# Patient Record
Sex: FEMALE | Race: WHITE | NOT HISPANIC OR LATINO | Employment: FULL TIME | URBAN - METROPOLITAN AREA
[De-identification: names, ages, dates, MRNs, and addresses within clinical notes are randomized per-mention and may not be internally consistent; named-entity substitution may affect disease eponyms.]

---

## 2017-08-18 ENCOUNTER — OFFICE VISIT (OUTPATIENT)
Dept: OCCUPATIONAL MEDICINE | Facility: CLINIC | Age: 52
End: 2017-08-18
Payer: OTHER MISCELLANEOUS

## 2017-08-18 VITALS
WEIGHT: 190 LBS | HEIGHT: 64 IN | DIASTOLIC BLOOD PRESSURE: 69 MMHG | TEMPERATURE: 97 F | OXYGEN SATURATION: 98 % | RESPIRATION RATE: 17 BRPM | HEART RATE: 70 BPM | SYSTOLIC BLOOD PRESSURE: 118 MMHG | BODY MASS INDEX: 32.44 KG/M2

## 2017-08-18 DIAGNOSIS — S93.601A RIGHT FOOT SPRAIN, INITIAL ENCOUNTER: Primary | ICD-10-CM

## 2017-08-18 PROCEDURE — 3008F BODY MASS INDEX DOCD: CPT | Mod: S$GLB,,, | Performed by: PHYSICIAN ASSISTANT

## 2017-08-18 PROCEDURE — 99203 OFFICE O/P NEW LOW 30 MIN: CPT | Mod: S$GLB,,, | Performed by: PHYSICIAN ASSISTANT

## 2017-08-18 RX ORDER — SPIRONOLACTONE 100 MG/1
200 TABLET, FILM COATED ORAL DAILY
COMMUNITY
Start: 2017-06-20 | End: 2022-12-09 | Stop reason: SDUPTHER

## 2017-08-18 RX ORDER — FLUOXETINE 10 MG/1
30 CAPSULE ORAL DAILY
COMMUNITY
End: 2021-04-22 | Stop reason: SDUPTHER

## 2017-08-18 NOTE — PATIENT INSTRUCTIONS
- Please return here or go to the Emergency Department for any concerns or worsening of condition.   - Use over-the-counter (OTC) Tylenol (acetaminophen) every 4-6 hours and/or Motrin/Advil (ibuprofen) every 6-8 hours as needed for pain or fever unless you have known allergies or been warned to avoid the medications due to other medical conditions. You may use the medications at the same time as they do not negatively interact with each other.    - Apply ice packs to the affected area(s) for 20-30 minutes several times daily for swelling and pain in addition to rest, elevation, and compression (ex. Extremities). Allow 1 hour between icing sessions to avoid damage to skin.   * Large, cheap, and reusable ice pack(s) can be easily made as follows. INSTRUCTIONS: Mix 1 cup of rubbing (isopropyl) alcohol to 3 cups water into a 1 gallon zip lock bag. Squeeze remaining air out of the bag and seal. Bag(s) to be kept flat in a freezer until cold and after each use.   - Please follow up with your primary care provider (PCP) or discussed specialist(s) as needed.           Foot Sprain    A sprain is a stretching or tearing of the ligaments that hold a joint together. There are no broken bones. Sprains generally take from 3-6 weeks to heal. A sprain may be treated with a splint, walking cast, or special boot. Mild sprains may not need any additional support.  Home care  The following guidelines will help you care for your injury at home:  · Keep your leg elevated when sitting or lying down. This is very important during the first 48 hours to reduce swelling. Stay off the injured foot as much as possible until you can walk on it without pain. If needed, you may use crutches during the first week for this purpose. Crutches can be rented at many pharmacies or surgical/orthopedic supply stores.  · You may be given a cast shoe to wear to prevent movement in your foot. If not, you can use a sandal or any shoe that does not put pressure  on the injured area until the swelling and pain go away. If using a sandal, be careful not to hit your foot against anything, since another injury could make the sprain worse.  · Apply an ice pack over the injured area for 15 to 20 minutes every 3 to 6 hours. You should do this for the first 24 to 48 hours. You can make an ice pack by filling a plastic bag that seals at the top with ice cubes and then wrapping it with a thin towel. Continue to use ice packs for relief of pain and swelling as needed. As the ice melts, avoid getting any wrap, splint, or cast wet. After 48 hours, apply heat from a warm shower or bath for 20 minutes several times daily. Alternating ice and heat may also be helpful.  · You may use over-the-counter pain medicine to control pain, unless another medicine was prescribed. If you have chronic liver or kidney disease or ever had a stomach ulcer or GI bleeding, talk with your healthcare provider before using these medicines.  · If you were given a splint or cast, keep it dry. Bathe with your splint or cast well out of the water, protected with 2 large plastic bags, rubber-banded at the top end. If a fiberglass splint or cast gets wet, you can dry it with a hair dryer.  · You may return to sports after healing, when you can run without pain.  Follow-up care  Follow up with your healthcare provider as directed. Sometimes fractures dont show up on the first X-ray. Bruises and sprains can sometimes hurt as much as a fracture. These injuries can take time to heal completely. If your symptoms dont improve or they get worse, talk with your healthcare provider. You may need a repeat X-ray.  When to seek medical advice  Call your healthcare provider right away if any of these occur:  · The plaster cast or splint gets wet or soft  · The fiberglass cast or splint gets wet and does not dry for 24 hours  · Pain or swelling increases, or redness appears  · A bad odor comes from within the cast  · Fever of  100.4°F (38°C) or above lasting for 24 to 48 hours  · Toes on the injured foot become cold, blue, numb, or tingly  Date Last Reviewed: 11/20/2015  © 1037-8453 [employer]. [address] [place] [address], [place] [address]. All rights reserved. This information is not intended as a substitute for professional medical care. Always follow your healthcare professional's instructions.

## 2017-08-18 NOTE — PROGRESS NOTES
Subjective:       Patient ID: Tori Quinn is a 51 y.o. female.    Chief Complaint: Foot Injury (pt. states she twisted her rt. foot on 8.18.17 at around 1:00pm.)    Foot Injury    The incident occurred 1 to 3 hours ago. The incident occurred at work. The injury mechanism was a twisting injury and an inversion injury. The pain is present in the right foot. The quality of the pain is described as aching. The pain is at a severity of 5/10. The pain is moderate. The pain has been constant since onset. Associated symptoms include an inability to bear weight. Pertinent negatives include no loss of motion, loss of sensation, muscle weakness, numbness or tingling. The symptoms are aggravated by weight bearing. She has tried nothing for the symptoms.     Review of Systems   Constitution: Negative for weakness and malaise/fatigue.   HENT: Negative for nosebleeds.    Cardiovascular: Negative for chest pain and syncope.   Respiratory: Negative for shortness of breath.    Musculoskeletal: Positive for joint pain (right foot) and joint swelling (right foot (lateral aspect)). Negative for back pain and neck pain.   Gastrointestinal: Negative for abdominal pain.   Genitourinary: Negative for hematuria.   Neurological: Negative for dizziness, numbness and tingling.       Objective:      Physical Exam   Constitutional: She is oriented to person, place, and time. She appears well-developed and well-nourished.  Non-toxic appearance. She does not have a sickly appearance. She does not appear ill. No distress.   HENT:   Head: Normocephalic and atraumatic. Head is without abrasion, without contusion and without laceration.   Right Ear: External ear normal.   Left Ear: External ear normal.   Nose: Nose normal.   Mouth/Throat: Oropharynx is clear and moist.   Eyes: Conjunctivae, EOM and lids are normal. Pupils are equal, round, and reactive to light.   Neck: Trachea normal, full passive range of motion without pain and phonation  "normal. Neck supple.   Cardiovascular: Normal rate, regular rhythm and normal heart sounds.    Pulmonary/Chest: Effort normal and breath sounds normal. No stridor. No respiratory distress.   Musculoskeletal: Normal range of motion.        Right foot: There is tenderness, bony tenderness and swelling. There is normal range of motion, normal capillary refill, no crepitus, no deformity and no laceration.        Feet:    Feet:   Right Foot:   Skin Integrity: Negative for erythema or warmth.   Neurological: She is alert and oriented to person, place, and time. Gait (mild limping on right foot) abnormal. Coordination normal.   Skin: Skin is warm, dry and intact. Capillary refill takes less than 2 seconds. No abrasion, no bruising, no burn, no ecchymosis, no laceration, no lesion and no rash noted. No erythema.   Psychiatric: She has a normal mood and affect. Her speech is normal and behavior is normal. Judgment and thought content normal. Cognition and memory are normal.   Nursing note and vitals reviewed.      /69 (BP Location: Right arm, Patient Position: Sitting, BP Method: Medium (Automatic))   Pulse 70   Temp 97.1 °F (36.2 °C) (Oral)   Resp 17   Ht 5' 4" (1.626 m)   Wt 86.2 kg (190 lb)   SpO2 98%   BMI 32.61 kg/m²      3 views of the right foot without comparison    Findings: There is no fracture or dislocation. A small Achilles enthesophyte is noted.   Impression   No acute osseous abnormality.    Electronically signed by: Miki Ortiz MD  Date: 08/18/17  Time: 15:47        Assessment:       1. Right foot sprain, initial encounter        Plan:       - Released to Full Duty on 8/21/2017  - Where comfortable and supportive shoes such as tennis/running shoes  - Use over-the-counter (OTC) Tylenol (acetaminophen) every 4-6 hours and/or Motrin/Advil (ibuprofen) every 6-8 hours as needed for pain or fever unless you have known allergies or been warned to avoid the medications due to other medical " conditions. You may use the medications at the same time as they do not negatively interact with each other.    - Apply ice packs to the affected area(s) for 20-30 minutes several times daily for swelling and pain in addition to rest, elevation, and compression (ex. Extremities). Allow 1 hour between icing sessions to avoid damage to skin.   * Large, cheap, and reusable ice pack(s) can be easily made as follows. INSTRUCTIONS: Mix 1 cup of rubbing (isopropyl) alcohol to 3 cups water into a 1 gallon zip lock bag. Squeeze remaining air out of the bag and seal. Bag(s) to be kept flat in a freezer until cold and after each use.   - Follow up at [facility] in [place] for further evaluation and management as needed.

## 2017-08-18 NOTE — LETTER
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 The results of this test are as follows:    No results found for: PPD  This test was {+/-:85623::"negative"} for tuberculosis exposure per current Centers for Disease Control guidelines.    A chest x-ray {was/not:[id number]::"was not"} required.    If you have any questions or concerns, please don't hesitate to call.    Sincerely,        Robert Garcia PA-C     "

## 2020-08-03 ENCOUNTER — OFFICE VISIT (OUTPATIENT)
Dept: URGENT CARE | Facility: CLINIC | Age: 55
End: 2020-08-03
Payer: COMMERCIAL

## 2020-08-03 VITALS
OXYGEN SATURATION: 97 % | TEMPERATURE: 100 F | RESPIRATION RATE: 16 BRPM | BODY MASS INDEX: 32.44 KG/M2 | DIASTOLIC BLOOD PRESSURE: 71 MMHG | WEIGHT: 190 LBS | HEART RATE: 79 BPM | HEIGHT: 64 IN | SYSTOLIC BLOOD PRESSURE: 133 MMHG

## 2020-08-03 DIAGNOSIS — R51.9 NONINTRACTABLE HEADACHE, UNSPECIFIED CHRONICITY PATTERN, UNSPECIFIED HEADACHE TYPE: ICD-10-CM

## 2020-08-03 DIAGNOSIS — R09.81 SINUS CONGESTION: Primary | ICD-10-CM

## 2020-08-03 DIAGNOSIS — Z20.822 EXPOSURE TO COVID-19 VIRUS: ICD-10-CM

## 2020-08-03 PROCEDURE — 99214 PR OFFICE/OUTPT VISIT, EST, LEVL IV, 30-39 MIN: ICD-10-PCS | Mod: S$GLB,CS,, | Performed by: PHYSICIAN ASSISTANT

## 2020-08-03 PROCEDURE — 99214 OFFICE O/P EST MOD 30 MIN: CPT | Mod: S$GLB,CS,, | Performed by: PHYSICIAN ASSISTANT

## 2020-08-03 PROCEDURE — U0003 INFECTIOUS AGENT DETECTION BY NUCLEIC ACID (DNA OR RNA); SEVERE ACUTE RESPIRATORY SYNDROME CORONAVIRUS 2 (SARS-COV-2) (CORONAVIRUS DISEASE [COVID-19]), AMPLIFIED PROBE TECHNIQUE, MAKING USE OF HIGH THROUGHPUT TECHNOLOGIES AS DESCRIBED BY CMS-2020-01-R: HCPCS

## 2020-08-03 RX ORDER — FLUTICASONE PROPIONATE 50 MCG
1 SPRAY, SUSPENSION (ML) NASAL 2 TIMES DAILY PRN
Qty: 15.8 ML | Refills: 1 | Status: SHIPPED | OUTPATIENT
Start: 2020-08-03 | End: 2021-04-22

## 2020-08-03 RX ORDER — CIPROFLOXACIN HYDROCHLORIDE 3 MG/ML
SOLUTION/ DROPS OPHTHALMIC
COMMUNITY
Start: 2020-07-30 | End: 2021-04-22

## 2020-08-03 NOTE — PROGRESS NOTES
"Subjective:       Patient ID: Tori Quinn is a 54 y.o. female.    Vitals:  height is 5' 4" (1.626 m) and weight is 86.2 kg (190 lb). Her temperature is 99.6 °F (37.6 °C). Her blood pressure is 133/71 and her pulse is 79. Her respiration is 16 and oxygen saturation is 97%.     Chief Complaint: Otalgia    Patient reports ear ache and headache for the last 6 days. Primary care prescribed cipro drops since Friday.    Otalgia   There is pain in the left ear. This is a new problem. The current episode started in the past 7 days. The problem occurs constantly. The problem has been gradually improving. There has been no fever. Associated symptoms include headaches. Pertinent negatives include no coughing, diarrhea, ear discharge, hearing loss, rash, sore throat or vomiting. She has tried ear drops for the symptoms. The treatment provided moderate relief.       Constitution: Negative for chills, fatigue and fever.   HENT: Positive for ear pain. Negative for ear discharge, hearing loss, congestion and sore throat.    Neck: Negative for painful lymph nodes.   Cardiovascular: Negative for chest pain and leg swelling.   Eyes: Negative for double vision and blurred vision.   Respiratory: Negative for cough and shortness of breath.    Gastrointestinal: Negative for nausea, vomiting and diarrhea.   Genitourinary: Negative for dysuria, frequency, urgency and history of kidney stones.   Musculoskeletal: Negative for joint pain, joint swelling, muscle cramps and muscle ache.   Skin: Negative for color change, pale, rash and bruising.   Allergic/Immunologic: Negative for seasonal allergies.   Neurological: Positive for headaches. Negative for dizziness, history of vertigo, light-headedness and passing out.   Hematologic/Lymphatic: Negative for swollen lymph nodes.   Psychiatric/Behavioral: Negative for nervous/anxious, sleep disturbance and depression. The patient is not nervous/anxious.        Objective:      Physical Exam "   Constitutional: She is oriented to person, place, and time. She appears well-developed. She is cooperative.  Non-toxic appearance. She does not appear ill. No distress.   HENT:   Head: Normocephalic and atraumatic.   Ears:   Right Ear: Hearing, tympanic membrane, external ear and ear canal normal. No foreign bodies. Tympanic membrane is not injected, not erythematous and not bulging.   Left Ear: Hearing and external ear normal. A foreign body (scabbing from abrasion, recent cerumen removal) is present. Tympanic membrane is retracted. Tympanic membrane is not injected, not erythematous and not bulging.   Nose: Nose normal. No mucosal edema, rhinorrhea or nasal deformity. No epistaxis. Right sinus exhibits no maxillary sinus tenderness and no frontal sinus tenderness. Left sinus exhibits no maxillary sinus tenderness and no frontal sinus tenderness.   Mouth/Throat: Uvula is midline, oropharynx is clear and moist and mucous membranes are normal. No trismus in the jaw. Normal dentition. No uvula swelling. No oropharyngeal exudate, posterior oropharyngeal edema or posterior oropharyngeal erythema.   Eyes: Conjunctivae and lids are normal. No scleral icterus.   Neck: Trachea normal, full passive range of motion without pain and phonation normal. Neck supple. No neck rigidity. No edema and no erythema present.   Cardiovascular: Normal rate, regular rhythm, normal heart sounds and normal pulses.   Pulmonary/Chest: Effort normal and breath sounds normal. No respiratory distress. She has no decreased breath sounds. She has no rhonchi.   Abdominal: Normal appearance.   Musculoskeletal: Normal range of motion.         General: No deformity.   Neurological: She is alert and oriented to person, place, and time. She exhibits normal muscle tone. Coordination normal.   Skin: Skin is warm, dry, intact, not diaphoretic and not pale. Psychiatric: Her speech is normal and behavior is normal. Judgment and thought content normal.  "  Nursing note and vitals reviewed.        Assessment:       1. Sinus congestion    2. Nonintractable headache, unspecified chronicity pattern, unspecified headache type    3. Exposure to Covid-19 Virus        Plan:         Sinus congestion    Nonintractable headache, unspecified chronicity pattern, unspecified headache type  -     COVID-19 Routine Screening    Exposure to Covid-19 Virus    will call with results.   Patient Instructions   NASAL ALLERGY    Nasal Allergy, also called "Allergic Rhinitis" occurs after exposure to pollen, molds, mildew, animal "dander" (scales from animal skin, hair and feathers), dust, smoke and fumes. (These are called "allergens"). When pollen causes a nasal allergy it is commonly called "Hay Fever".    When these particles contact the lining of the nose, eyes, eyelids, sinuses or throat, they cause the cells to release a chemical called "histamine". Histamine may cause a watery discharge from the eyes or nose. It may also cause violent sneezing, nasal congestion, itching of the eyes, nose, throat and mouth.    PREVENTION:    Nasal allergy cannot be cured but symptoms can be reduced. Avoid or reduce exposure to the allergen when possible.    HOME CARE:    1) DECONGESTANT pills and sprays (Sudafed, NeoSynephrine), reduce tissue swelling and watery discharge. Overuse of nasal decongestant sprays may make symptoms worse, ESPECIALLY IF YOU HAVE HIGH BLOOD PRESSURE. Do not use these more often than recommended. Get an over the counter Nasal Saline spray to supplement Flonase    2) ANTIHISTAMINES block the release of histamine during the allergic response. Antihistamines are more effective when taken BEFORE symptoms develop. Unless a prescription antihistamine was prescribed, you may take CLARITIN (loratadine). (Claritin is an over-the-counter antihistamine that does not cause drowsiness.) You can also consider Xyzal, Allegra, Zyrtec.    3) STEROID nasal sprays (Beconase, Vancenase, " Nasalide, Nasocort, Flonase) or oral steroids (Prednisone) may also be prescribed for more severe symptoms. These help to reduce the local inflammation which adds to the allergic response.    4) If you have ASTHMA, pollen season may make your asthma symptoms worse. It is important that you use your asthma medicines as directed during this time to prevent or treat attacks. Some persons with asthma have a worsening of their asthma symptoms when taking antihistamines. If you notice this, stop the antihistamines and notify your doctor.    5) Consider a Chitto Rhino Nasal and Sinus Rinse 2-3 times/week if your symptoms are chronic. ([email or web address])  Symptomatic treatment:    Alternate Tylenol and Ibuprofen every 3 hrs  salt water gargles to soothe throat  Honey/lemon water to soothe throat  Cold-eeze helps to reduce the duration of URI symptoms  Elderberry to reduce duration of URI symptoms  Cepachol helps to numb the discomfort in throat  Nasal saline spray reduces inflammation and dryness  Warm face compresses/hot showers as often as you can to open sinuses   Vicks vapor rub at night  Flonase OTC or Nasacort OTC  Simple foods like chicken noodle soup help hydrate  Delsym helps with coughing at night  Zyrtec/Claritin during the day and Benadryl at night may help if allergy component   Rest as much as you can  Your symptoms will likely last 5-7 days, maybe longer depending on how it affects your body.  You are contagious for 5-7 days, so minimize contact with others to reduce the spread to others. Stay hydrated. Dehydration is preventable but is one of the main reasons why you will feel so badly. Water is preferred, but drink pedialyte, Gatorade, [place], Squencher or propel if you need a different taste. Avoid Sugary Drinks!    Antibiotics are not needed unless a complication(such as Otitis Media, Bacterial sinus infection or pneumonia). Taking antibiotics for Flu/Cold is not supported by evidence-based medicine and  can expose you to unnecessary side effects of the medication, such as anaphylaxis.   If you experience any of the following, go to the nearest Emergency Department:   - Chest pain, shortness of breath, wheezing or difficulty breathing,    - Severe headache, face, neck or ear pain, New rash   - Fever over 101.5º F (38.6 C) for more than three days   - Confusion, behavior change or seizure   - Severe weakness or dizziness   Thank you for enrolling in [facility]. Please follow the instructions below to securely access your online medical record. [facility] allows you to send messages to your doctor, view your test results, renew your prescriptions, schedule appointments, and more.     How Do I Sign Up?  1. In your Internet browser, go to [email or web address].  2. In the lower right of the page, click the Activate Now link located under the Have Access Code? Title.  3. Enter your [facility] Access Code exactly as it appears below. You will not need to use this code after youve completed the sign-up process. If you do not sign up before the expiration date, you must request a new code.  [facility] Access Code: Activation code not generated  Current Patient Portal Status: Active    4. Enter Date of Birth (mm/dd/yyyy) as indicated and click the Next button. You will be taken to the next sign-up page.  5. Create a [facility] ID. This will be your new [facility] login ID and cannot be changed, so think of one that is secure and easy to remember.  6. Create a [facility] password.  Your password must be at least 8 characters long and contain at least 1 letter and 1 number.  You can change your password at any time.  7. Enter your Password Reset Question and Answer, then click the Next button.   8. Enter your e-mail address. You will receive e-mail notification when new information is available in [facility].  9. Click Sign Up. You can now view your medical record.     Additional Information  If you have questions, you can email  [email or web address] or call [phone]  to talk to our [facility] staff. Remember, [facility] is NOT to be used for urgent needs. For medical emergencies, dial 911.     Instructions for Patients Awaiting COVID-19 Test Results    You will either be called with your test result or it will be released to the patient portal.  If you have any questions about your test, please visit [email or web address] or call our COVID-19 information line at [phone].    Prevention steps for patients with confirmed or suspected COVID-19     Stay home except to get medical care.   Separate yourself from other people and animals in your home   Call ahead before visiting your doctor.   Wear a facemask.   Cover your coughs and sneezes.   Clean your hands often.   Avoid sharing personal household items.   Clean all high-touch surfaces every day.   Monitor your symptoms. Seek prompt medical attention if your illness is worsening (e.g., difficulty breathing). Before seeking care, call your healthcare provider.   If you have a medical emergency and need to call 911, notify the dispatch personnel that you have, or are being evaluated for COVID-19. If possible, put on a facemask before emergency medical services arrive.   Discontinuing home isolation. Call your provider about guidance to discontinue home isolation.    Recommended precautions for household members, intimate partners, and caregivers in a nonhealthcare setting of a patient with symptomatic laboratory-confirmed COVID-19 or a patient under investigation.  Household members, intimate partners, and caregivers in a nonhealthcare setting may have close contact with a person with symptomatic, laboratory-confirmed COVID-19 or a person under investigation. Close contacts should monitor their health; they should call their healthcare provider right away if they develop symptoms suggestive of COVID-19 (e.g., fever, cough, shortness of breath).    Close contacts  should also follow these recommendations:   Make sure that you understand and can help the patient follow their healthcare provider's instructions for medication(s) and care. You should help the patient with basic needs in the home and provide support for getting groceries, prescriptions, and other personal needs.   Monitor the patient's symptoms. If the patient is getting sicker, call his or her healthcare provider and tell them that the patient has laboratory-confirmed COVID-19. This will help the healthcare provider's office take steps to keep other people in the office or waiting room from getting infected. Ask the healthcare provider to call the local or [facility] health department for additional guidance. If the patient has a medical emergency and you need to call 911, notify the dispatch personnel that the patient has, or is being evaluated for COVID-19.   Household members should stay in another room or be  from the patient as much as possible. Household members should use a separate bedroom and bathroom, if available.   Prohibit visitors who do not have an essential need to be in the home.   Household members should care for any pets in the home. Do not handle pets or other animals while sick.   Make sure that shared spaces in the home have good air flow, such as by an air conditioner or an opened window, weather permitting.   Perform hand hygiene frequently. Wash your hands often with soap and water for at least 20 seconds or use an alcohol-based hand  that contains 60 to 95% alcohol, covering all surfaces of your hands and rubbing them together until they feel dry. Soap and water should be used preferentially if hands are visibly dirty.   Avoid touching your eyes, nose, and mouth with unwashed hands.   The patient should wear a facemask when you are around other people. If the patient is not able to wear a facemask (for example, because it causes trouble breathing), you, as the  caregiver should wear a mask when you are in the same room as the patient.   Wear a disposable facemask and gloves when you touch or have contact with the patient's blood, stool, or body fluids, such as saliva, sputum, nasal mucus, vomit, urine.  o Throw out disposable facemasks and gloves after using them. Do not reuse.  o When removing personal protective equipment, first remove and dispose of gloves. Then, immediately clean your hands with soap and water or alcohol-based hand . Next, remove and dispose of facemask, and immediately clean your hands again with soap and water or alcohol-based hand .   Avoid sharing household items with the patient. You should not share dishes, drinking glasses, cups, eating utensils, towels, bedding, or other items. After the patient uses these items, you should wash them thoroughly (see below Wash laundry thoroughly).   Clean all high-touch surfaces, such as counters, tabletops, doorknobs, bathroom fixtures, toilets, phones, keyboards, tablets, and bedside tables, every day. Also, clean any surfaces that may have blood, stool, or body fluids on them.   Use a household cleaning spray or wipe, according to the label instructions. Labels contain instructions for safe and effective use of the cleaning product including precautions you should take when applying the product, such as wearing gloves and making sure you have good ventilation during use of the product.   Wash laundry thoroughly.  o Immediately remove and wash clothes or bedding that have blood, stool, or body fluids on them.  o Wear disposable gloves while handling soiled items and keep soiled items away from your body. Clean your hands (with soap and water or an alcohol-based hand ) immediately after removing your gloves.  o Read and follow directions on labels of laundry or clothing items and detergent. In general, using a normal laundry detergent according to washing machine  instructions and dry thoroughly using the warmest temperatures recommended on the clothing label.   Place all used disposable gloves, facemasks, and other contaminated items in a lined container before disposing of them with other household waste. Clean your hands (with soap and water or an alcohol-based hand ) immediately after handling these items. Soap and water should be used preferentially if hands are visibly dirty.   Discuss any additional questions with your state or local health department or healthcare provider. Check available hours when contacting your local health department.    For more information see CDC link below.      https://www.cdc.gov/coronavirus/2019-ncov/hcp/guidance-prevent-spread.html#precautions        Sources:  CDC, [place] Department [facility] and [facility]        If not allergic,take tylenol (acetominophen) for fever control, chills, or body aches every 4 hours. Do not exceed 4000 mg/ day.If not allergic, take Motrin (Ibuprofen) every 4 hours for fever, chills, pain or inflammation. Do not exceed 2400 mg/day. You can alternate taking tylenol and motrin.    If you were prescribed a narcotic medication, do not drive or operate heavy equipment or machinery while taking these medications.  You must understand that you've received an Urgent Care treatment only and that you may be released before all your medical problems are known or treated. You, the patient, will arrange for follow up care as instructed.  Follow up with your PCP or specialty clinic as directed in the next 1-2 weeks if not improved or as needed.  You can call [phone] to schedule an appointment with the appropriate provider.  If your condition worsens we recommend that you receive another evaluation at the emergency room immediately or contact your primary medical clinics after hours call service to discuss your concerns.    Please return here or go to the Emergency Department for any concerns or  worsening of condition.    If you have been referred to another provider and wish to call to check on the status of your referral, please call [facility] at [phone]

## 2020-08-03 NOTE — PATIENT INSTRUCTIONS
"NASAL ALLERGY    Nasal Allergy, also called "Allergic Rhinitis" occurs after exposure to pollen, molds, mildew, animal "dander" (scales from animal skin, hair and feathers), dust, smoke and fumes. (These are called "allergens"). When pollen causes a nasal allergy it is commonly called "Hay Fever".    When these particles contact the lining of the nose, eyes, eyelids, sinuses or throat, they cause the cells to release a chemical called "histamine". Histamine may cause a watery discharge from the eyes or nose. It may also cause violent sneezing, nasal congestion, itching of the eyes, nose, throat and mouth.    PREVENTION:    Nasal allergy cannot be cured but symptoms can be reduced. Avoid or reduce exposure to the allergen when possible.    HOME CARE:    1) DECONGESTANT pills and sprays (Sudafed, NeoSynephrine), reduce tissue swelling and watery discharge. Overuse of nasal decongestant sprays may make symptoms worse, ESPECIALLY IF YOU HAVE HIGH BLOOD PRESSURE. Do not use these more often than recommended. Get an over the counter Nasal Saline spray to supplement Flonase    2) ANTIHISTAMINES block the release of histamine during the allergic response. Antihistamines are more effective when taken BEFORE symptoms develop. Unless a prescription antihistamine was prescribed, you may take CLARITIN (loratadine). (Claritin is an over-the-counter antihistamine that does not cause drowsiness.) You can also consider Xyzal, Allegra, Zyrtec.    3) STEROID nasal sprays (Beconase, Vancenase, Nasalide, Nasocort, Flonase) or oral steroids (Prednisone) may also be prescribed for more severe symptoms. These help to reduce the local inflammation which adds to the allergic response.    4) If you have ASTHMA, pollen season may make your asthma symptoms worse. It is important that you use your asthma medicines as directed during this time to prevent or treat attacks. Some persons with asthma have a worsening of their asthma symptoms when " taking antihistamines. If you notice this, stop the antihistamines and notify your doctor.    5) Consider a Chitto Rhino Nasal and Sinus Rinse 2-3 times/week if your symptoms are chronic. ([email or web address])  Symptomatic treatment:    Alternate Tylenol and Ibuprofen every 3 hrs  salt water gargles to soothe throat  Honey/lemon water to soothe throat  Cold-eeze helps to reduce the duration of URI symptoms  Elderberry to reduce duration of URI symptoms  Cepachol helps to numb the discomfort in throat  Nasal saline spray reduces inflammation and dryness  Warm face compresses/hot showers as often as you can to open sinuses   Vicks vapor rub at night  Flonase OTC or Nasacort OTC  Simple foods like chicken noodle soup help hydrate  Delsym helps with coughing at night  Zyrtec/Claritin during the day and Benadryl at night may help if allergy component   Rest as much as you can  Your symptoms will likely last 5-7 days, maybe longer depending on how it affects your body.  You are contagious for 5-7 days, so minimize contact with others to reduce the spread to others. Stay hydrated. Dehydration is preventable but is one of the main reasons why you will feel so badly. Water is preferred, but drink pedialyte, Gatorade, Kishan, Squencher or propel if you need a different taste. Avoid Sugary Drinks!    Antibiotics are not needed unless a complication(such as Otitis Media, Bacterial sinus infection or pneumonia). Taking antibiotics for Flu/Cold is not supported by evidence-based medicine and can expose you to unnecessary side effects of the medication, such as anaphylaxis.   If you experience any of the following, go to the nearest Emergency Department:   - Chest pain, shortness of breath, wheezing or difficulty breathing,    - Severe headache, face, neck or ear pain, New rash   - Fever over 101.5º F (38.6 C) for more than three days   - Confusion, behavior change or seizure   - Severe weakness or dizziness   Thank you for  enrolling in [facility]. Please follow the instructions below to securely access your online medical record. My allows you to send messages to your doctor, view your test results, renew your prescriptions, schedule appointments, and more.     How Do I Sign Up?  1. In your Internet browser, go to [email or web address].  2. In the lower right of the page, click the Activate Now link located under the Have Access Code? Title.  3. Enter your [facility] Access Code exactly as it appears below. You will not need to use this code after youve completed the sign-up process. If you do not sign up before the expiration date, you must request a new code.  [facility] Access Code: Activation code not generated  Current Patient Portal Status: Active    4. Enter Date of Birth (mm/dd/yyyy) as indicated and click the Next button. You will be taken to the next sign-up page.  5. Create a [facility] ID. This will be your new [facility] login ID and cannot be changed, so think of one that is secure and easy to remember.  6. Create a [facility] password.  Your password must be at least 8 characters long and contain at least 1 letter and 1 number.  You can change your password at any time.  7. Enter your Password Reset Question and Answer, then click the Next button.   8. Enter your e-mail address. You will receive e-mail notification when new information is available in [facility].  9. Click Sign Up. You can now view your medical record.     Additional Information  If you have questions, you can email [email or web address] or call [phone]  to talk to our [facility] staff. Remember, [facility] is NOT to be used for urgent needs. For medical emergencies, dial 911.     Instructions for Patients Awaiting COVID-19 Test Results    You will either be called with your test result or it will be released to the patient portal.  If you have any questions about your test, please visit [email or web address] or call our COVID-19 information line at  [phone].    Prevention steps for patients with confirmed or suspected COVID-19     Stay home except to get medical care.   Separate yourself from other people and animals in your home   Call ahead before visiting your doctor.   Wear a facemask.   Cover your coughs and sneezes.   Clean your hands often.   Avoid sharing personal household items.   Clean all high-touch surfaces every day.   Monitor your symptoms. Seek prompt medical attention if your illness is worsening (e.g., difficulty breathing). Before seeking care, call your healthcare provider.   If you have a medical emergency and need to call 911, notify the dispatch personnel that you have, or are being evaluated for COVID-19. If possible, put on a facemask before emergency medical services arrive.   Discontinuing home isolation. Call your provider about guidance to discontinue home isolation.    Recommended precautions for household members, intimate partners, and caregivers in a nonhealthcare setting of a patient with symptomatic laboratory-confirmed COVID-19 or a patient under investigation.  Household members, intimate partners, and caregivers in a nonhealthcare setting may have close contact with a person with symptomatic, laboratory-confirmed COVID-19 or a person under investigation. Close contacts should monitor their health; they should call their healthcare provider right away if they develop symptoms suggestive of COVID-19 (e.g., fever, cough, shortness of breath).    Close contacts should also follow these recommendations:   Make sure that you understand and can help the patient follow their healthcare provider's instructions for medication(s) and care. You should help the patient with basic needs in the home and provide support for getting groceries, prescriptions, and other personal needs.   Monitor the patient's symptoms. If the patient is getting sicker, call his or her healthcare provider and tell them that the patient  has laboratory-confirmed COVID-19. This will help the healthcare provider's office take steps to keep other people in the office or waiting room from getting infected. Ask the healthcare provider to call the local or state health department for additional guidance. If the patient has a medical emergency and you need to call 911, notify the dispatch personnel that the patient has, or is being evaluated for COVID-19.   Household members should stay in another room or be  from the patient as much as possible. Household members should use a separate bedroom and bathroom, if available.   Prohibit visitors who do not have an essential need to be in the home.   Household members should care for any pets in the home. Do not handle pets or other animals while sick.   Make sure that shared spaces in the home have good air flow, such as by an air conditioner or an opened window, weather permitting.   Perform hand hygiene frequently. Wash your hands often with soap and water for at least 20 seconds or use an alcohol-based hand  that contains 60 to 95% alcohol, covering all surfaces of your hands and rubbing them together until they feel dry. Soap and water should be used preferentially if hands are visibly dirty.   Avoid touching your eyes, nose, and mouth with unwashed hands.   The patient should wear a facemask when you are around other people. If the patient is not able to wear a facemask (for example, because it causes trouble breathing), you, as the caregiver should wear a mask when you are in the same room as the patient.   Wear a disposable facemask and gloves when you touch or have contact with the patient's blood, stool, or body fluids, such as saliva, sputum, nasal mucus, vomit, urine.  o Throw out disposable facemasks and gloves after using them. Do not reuse.  o When removing personal protective equipment, first remove and dispose of gloves. Then, immediately clean your hands with soap and  water or alcohol-based hand . Next, remove and dispose of facemask, and immediately clean your hands again with soap and water or alcohol-based hand .   Avoid sharing household items with the patient. You should not share dishes, drinking glasses, cups, eating utensils, towels, bedding, or other items. After the patient uses these items, you should wash them thoroughly (see below Wash laundry thoroughly).   Clean all high-touch surfaces, such as counters, tabletops, doorknobs, bathroom fixtures, toilets, phones, keyboards, tablets, and bedside tables, every day. Also, clean any surfaces that may have blood, stool, or body fluids on them.   Use a household cleaning spray or wipe, according to the label instructions. Labels contain instructions for safe and effective use of the cleaning product including precautions you should take when applying the product, such as wearing gloves and making sure you have good ventilation during use of the product.   Wash laundry thoroughly.  o Immediately remove and wash clothes or bedding that have blood, stool, or body fluids on them.  o Wear disposable gloves while handling soiled items and keep soiled items away from your body. Clean your hands (with soap and water or an alcohol-based hand ) immediately after removing your gloves.  o Read and follow directions on labels of laundry or clothing items and detergent. In general, using a normal laundry detergent according to washing machine instructions and dry thoroughly using the warmest temperatures recommended on the clothing label.   Place all used disposable gloves, facemasks, and other contaminated items in a lined container before disposing of them with other household waste. Clean your hands (with soap and water or an alcohol-based hand ) immediately after handling these items. Soap and water should be used preferentially if hands are visibly dirty.   Discuss any additional  questions with your state or local health department or healthcare provider. Check available hours when contacting your local health department.    For more information see CDC link below.      https://www.cdc.gov/coronavirus/2019-ncov/hcp/guidance-prevent-spread.html#precautions        Sources:  CDC, [place] [facility] and [facility]        If not allergic,take tylenol (acetominophen) for fever control, chills, or body aches every 4 hours. Do not exceed 4000 mg/ day.If not allergic, take Motrin (Ibuprofen) every 4 hours for fever, chills, pain or inflammation. Do not exceed 2400 mg/day. You can alternate taking tylenol and motrin.    If you were prescribed a narcotic medication, do not drive or operate heavy equipment or machinery while taking these medications.  You must understand that you've received an Urgent Care treatment only and that you may be released before all your medical problems are known or treated. You, the patient, will arrange for follow up care as instructed.  Follow up with your PCP or specialty clinic as directed in the next 1-2 weeks if not improved or as needed.  You can call [phone] to schedule an appointment with the appropriate provider.  If your condition worsens we recommend that you receive another evaluation at the emergency room immediately or contact your primary medical clinics after hours call service to discuss your concerns.    Please return here or go to the Emergency Department for any concerns or worsening of condition.    If you have been referred to another provider and wish to call to check on the status of your referral, please call [facility] at [phone]

## 2020-08-04 ENCOUNTER — TELEPHONE (OUTPATIENT)
Dept: URGENT CARE | Facility: CLINIC | Age: 55
End: 2020-08-04

## 2020-08-04 LAB — SARS-COV-2 RNA RESP QL NAA+PROBE: NOT DETECTED

## 2020-08-13 LAB
CHOLEST SERPL-MSCNC: 208 MG/DL (ref 0–200)
HDLC SERPL-MCNC: 48 MG/DL (ref 35–70)
LDLC SERPL CALC-MCNC: 123 MG/DL (ref 0–160)
TRIGL SERPL-MCNC: 184 MG/DL (ref 40–160)

## 2020-08-18 ENCOUNTER — OFFICE VISIT (OUTPATIENT)
Dept: URGENT CARE | Facility: CLINIC | Age: 55
End: 2020-08-18
Payer: COMMERCIAL

## 2020-08-18 VITALS
DIASTOLIC BLOOD PRESSURE: 72 MMHG | SYSTOLIC BLOOD PRESSURE: 113 MMHG | WEIGHT: 190 LBS | HEIGHT: 64 IN | HEART RATE: 94 BPM | OXYGEN SATURATION: 97 % | TEMPERATURE: 97 F | RESPIRATION RATE: 18 BRPM | BODY MASS INDEX: 32.44 KG/M2

## 2020-08-18 DIAGNOSIS — Z01.84 ENCOUNTER FOR ANTIBODY RESPONSE EXAMINATION: ICD-10-CM

## 2020-08-18 DIAGNOSIS — J34.89 RHINORRHEA: Primary | ICD-10-CM

## 2020-08-18 PROCEDURE — 99214 PR OFFICE/OUTPT VISIT, EST, LEVL IV, 30-39 MIN: ICD-10-PCS | Mod: S$GLB,,, | Performed by: PHYSICIAN ASSISTANT

## 2020-08-18 PROCEDURE — 99214 OFFICE O/P EST MOD 30 MIN: CPT | Mod: S$GLB,,, | Performed by: PHYSICIAN ASSISTANT

## 2020-08-18 PROCEDURE — 86769 SARS-COV-2 COVID-19 ANTIBODY: CPT

## 2020-08-18 RX ORDER — AZELASTINE 1 MG/ML
1 SPRAY, METERED NASAL 2 TIMES DAILY
Qty: 30 ML | Refills: 2 | Status: SHIPPED | OUTPATIENT
Start: 2020-08-18 | End: 2021-03-05 | Stop reason: SDUPTHER

## 2020-08-18 RX ORDER — CYCLOBENZAPRINE HCL 10 MG
TABLET ORAL
COMMUNITY
Start: 2020-07-28 | End: 2021-04-22 | Stop reason: SDUPTHER

## 2020-08-18 NOTE — PATIENT INSTRUCTIONS
If you test positive: ?A positive test result shows you have antibodies that likely resulted from an infection with SARS-CoV-2, or possibly a related coronavirus.  ?Its unclear if those antibodies can provide protection (immunity) against getting infected again. This means that we do not know at this time if antibodies make you immune to the virus.  ?If you have no symptoms, you likely do not have an active infection and no additional follow-up is needed.  ?If you have symptoms and meet other guidelines for testing, you would need another type of test called a nucleic acid test, or viral test. This test uses respiratory samples, such as a swab from inside your nose, to confirm COVID-19. An antibody test cannot tell if you are currently sick with COVID-19.  ?Its possible you might test positive for antibodies and you might not have or have ever had symptoms of COVID-19. This is known as having an asymptomatic infection, or an infection without symptoms.    If you test negative:  ?If you test negative for COVID-19 antibodies, you probably did not have a previous infection that has gotten better. However, you could have a current infection. Its possible you could still get sick if you have been exposed to the virus recently, since antibodies dont show up for 1 to 3 weeks after infection. This means you could still spread the virus.  ?Some people may take even longer to develop antibodies, and some people may not develop antibodies.  ?If you have symptoms and meet other guidelines for testing, you would need another type of test called a nucleic acid test, or viral test. This test uses respiratory samples, such as a swab from inside your nose, to confirm COVID-19. An antibody test cannot tell if you are currently sick with COVID-19.    These test results alone do not confirm if you are able to spread the virus that causes COVID-19. Know how to protect yourself and others.

## 2020-08-18 NOTE — PROGRESS NOTES
"Subjective:       Patient ID: Tori Quinn is a 54 y.o. female.    Vitals:  height is 5' 4" (1.626 m) and weight is 86.2 kg (190 lb). Her temperature is 97.2 °F (36.2 °C). Her blood pressure is 113/72 and her pulse is 94. Her respiration is 18 and oxygen saturation is 97%.     Chief Complaint: Otalgia    Patient presents to Urgent Care today complaining of pain in her left ear x 20 days. She was here 15 days ago and was tested for Covid with a negative result. She was given amoxicillin and ear drops for 7 days, the pain improved but it is now bothering her again. She is also complaining of a sinus headache and pressure. Patient also would like an Covid antibody test today.    Otalgia   There is pain in the left ear. This is a recurrent problem. The current episode started 1 to 4 weeks ago. The problem occurs constantly. The problem has been gradually worsening. There has been no fever. Associated symptoms include headaches. Pertinent negatives include no abdominal pain, coughing, diarrhea, ear discharge, hearing loss, neck pain, rash, rhinorrhea, sore throat or vomiting. She has tried acetaminophen for the symptoms. The treatment provided no relief.       Constitution: Negative for chills and fever.   HENT: Positive for ear pain and sinus pressure. Negative for ear discharge, hearing loss and sore throat.    Neck: Negative for neck pain and neck stiffness.   Respiratory: Negative for cough and shortness of breath.    Gastrointestinal: Negative for abdominal pain, vomiting and diarrhea.   Musculoskeletal: Negative for muscle cramps and muscle ache.   Skin: Negative for rash and hives.   Allergic/Immunologic: Negative for hives.   Neurological: Positive for headaches. Negative for dizziness.       Objective:      Physical Exam   Constitutional: She is oriented to person, place, and time. She appears well-developed. She is cooperative.  Non-toxic appearance. She does not appear ill. No distress.   HENT:   Head: " Normocephalic and atraumatic.   Ears:   Right Ear: Hearing, external ear and ear canal normal. Tympanic membrane is bulging (Clear).   Left Ear: Hearing, external ear and ear canal normal. Tympanic membrane is bulging (Clear).   Nose: Nose normal. No mucosal edema, rhinorrhea or nasal deformity. No epistaxis. Right sinus exhibits no maxillary sinus tenderness and no frontal sinus tenderness. Left sinus exhibits no maxillary sinus tenderness and no frontal sinus tenderness.   Mouth/Throat: Uvula is midline, oropharynx is clear and moist and mucous membranes are normal. No trismus in the jaw. Normal dentition. No uvula swelling. No oropharyngeal exudate, posterior oropharyngeal edema or posterior oropharyngeal erythema.   Eyes: Conjunctivae and lids are normal. No scleral icterus.   Neck: Trachea normal, full passive range of motion without pain and phonation normal. Neck supple. No neck rigidity. No edema and no erythema present.   Cardiovascular: Normal rate, regular rhythm, normal heart sounds and normal pulses.   Pulmonary/Chest: Effort normal and breath sounds normal. No respiratory distress. She has no decreased breath sounds. She has no rhonchi.   Abdominal: Normal appearance.   Musculoskeletal: Normal range of motion.         General: No deformity.   Neurological: She is alert and oriented to person, place, and time. She exhibits normal muscle tone. Coordination normal.   Skin: Skin is warm, dry, intact, not diaphoretic and not pale. Psychiatric: Her speech is normal and behavior is normal. Judgment and thought content normal.   Nursing note and vitals reviewed.        Assessment:       1. Rhinorrhea    2. Encounter for antibody response examination        Plan:         Rhinorrhea    Encounter for antibody response examination  -     COVID-19 (SARS CoV-2) IgG Antibody    Other orders  -     azelastine (ASTELIN) 137 mcg (0.1 %) nasal spray; 1 spray (137 mcg total) by Nasal route 2 (two) times daily.  Dispense:  30 mL; Refill: 2     Will add Astelin to the regimen.  To follow-up with ENT or PCP if symptoms do not improve.    Patient Instructions   If you test positive: ?A positive test result shows you have antibodies that likely resulted from an infection with SARS-CoV-2, or possibly a related coronavirus.  ?Its unclear if those antibodies can provide protection (immunity) against getting infected again. This means that we do not know at this time if antibodies make you immune to the virus.  ?If you have no symptoms, you likely do not have an active infection and no additional follow-up is needed.  ?If you have symptoms and meet other guidelines for testing, you would need another type of test called a nucleic acid test, or viral test. This test uses respiratory samples, such as a swab from inside your nose, to confirm COVID-19. An antibody test cannot tell if you are currently sick with COVID-19.  ?Its possible you might test positive for antibodies and you might not have or have ever had symptoms of COVID-19. This is known as having an asymptomatic infection, or an infection without symptoms.    If you test negative:  ?If you test negative for COVID-19 antibodies, you probably did not have a previous infection that has gotten better. However, you could have a current infection. Its possible you could still get sick if you have been exposed to the virus recently, since antibodies dont show up for 1 to 3 weeks after infection. This means you could still spread the virus.  ?Some people may take even longer to develop antibodies, and some people may not develop antibodies.  ?If you have symptoms and meet other guidelines for testing, you would need another type of test called a nucleic acid test, or viral test. This test uses respiratory samples, such as a swab from inside your nose, to confirm COVID-19. An antibody test cannot tell if you are currently sick with COVID-19.    These test results alone do not confirm if  you are able to spread the virus that causes COVID-19. Know how to protect yourself and others.

## 2020-08-19 LAB — SARS-COV-2 IGG SERPLBLD QL IA.RAPID: NEGATIVE

## 2020-11-06 ENCOUNTER — OFFICE VISIT (OUTPATIENT)
Dept: URGENT CARE | Facility: CLINIC | Age: 55
End: 2020-11-06
Payer: OTHER MISCELLANEOUS

## 2020-11-06 VITALS
BODY MASS INDEX: 32.44 KG/M2 | OXYGEN SATURATION: 99 % | TEMPERATURE: 98 F | RESPIRATION RATE: 16 BRPM | WEIGHT: 190 LBS | HEART RATE: 74 BPM | HEIGHT: 64 IN
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
## 2020-11-06 PROCEDURE — 90715 TDAP VACCINE 7 YRS/> IM: CPT | Mod: AT,S$GLB,, | Performed by: FAMILY MEDICINE

## 2020-11-06 PROCEDURE — 99214 OFFICE O/P EST MOD 30 MIN: CPT | Mod: 25,S$GLB,, | Performed by: PHYSICIAN ASSISTANT

## 2020-11-06 PROCEDURE — 90715 TDAP VACCINE GREATER THAN OR EQUAL TO 7YO IM: ICD-10-PCS | Mod: AT,S$GLB,, | Performed by: FAMILY MEDICINE

## 2020-11-06 PROCEDURE — 90471 IMMUNIZATION ADMIN: CPT | Mod: S$GLB,,, | Performed by: FAMILY MEDICINE

## 2020-11-06 PROCEDURE — 99214 PR OFFICE/OUTPT VISIT, EST, LEVL IV, 30-39 MIN: ICD-10-PCS | Mod: 25,S$GLB,, | Performed by: PHYSICIAN ASSISTANT

## 2020-11-06 PROCEDURE — 90471 TDAP VACCINE GREATER THAN OR EQUAL TO 7YO IM: ICD-10-PCS | Mod: S$GLB,,, | Performed by: FAMILY MEDICINE

## 2020-11-06 RX ORDER — AMOXICILLIN AND CLAVULANATE POTASSIUM 875; 125 MG/1; MG/1
1 TABLET, FILM COATED ORAL 2 TIMES DAILY
Qty: 20 TABLET | Refills: 0 | Status: SHIPPED | OUTPATIENT
Start: 2020-11-06 | End: 2020-11-16

## 2020-11-06 RX ORDER — MUPIROCIN 20 MG/G
OINTMENT TOPICAL
Qty: 22 G | Refills: 1 | Status: SHIPPED | OUTPATIENT
Start: 2020-11-06 | End: 2021-04-22
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
## 2020-11-06 NOTE — PATIENT INSTRUCTIONS
· Follow up with your primary care if symptoms do not improve, or you may return here at any time.  · If you were referred to a specialist, please follow up with that specialty.  · If you were prescribed antibiotics, please take them to completion.  · If you were prescribed a narcotic or any medication with sedative effects, do not drive or operate heavy equipment or machinery while taking these medications.  · You must understand that you have received treatment at an Urgent Care facility only, and that you may be released before all of your medical problems are known or treated. Urgent Care facilities are not equipped to handle life threatening emergencies. It is recommended that you seek care at an Emergency Department for further evaluation of worsening or concerning symptoms, or possibly life threatening conditions as discussed.                                        If you  smoke, please stop smoking               PLEASE PRACTICE SOCIAL DISTANCING        Human Bite  The mouth has bacteria (germs) that can cause a very severe infection. If the tooth of another person has cut your skin, there is a chance of a serious infection developing within the first few days. Bites to the hand are especially prone to infection of the skin (such as cellulitis). Diseases (such as hepatitis B or C, or herpes simplex virus) may also be transmitted through human bites.  Human bite wounds may be either sutured closed or left open to heal depending on location, length of time since the bite, severity, signs of infection, and other concerns. Your doctor may want to do blood tests, a wound culture, X-ray, ultrasound, or others. Your doctor will explain if you need any of these and discuss your results.  Home care  The following will help you care for your wound at home:  1. Most skin wounds heal within 10 days. However, a human bite wound has a higher risk of getting infected. Look at the bite area each day for the next 4 days for  signs of infection (listed below).  2. For certain types of wounds, an antibiotic will be prescribed. This will depend on several factors such as severity, surrounding structure injury, depth, location, and others. Take all antibiotics and medicines as directed until they are all gone.  3. If the bite is on the hand, arm, foot, or leg, limit the use of that extremity and keep it elevated for the first 24 hours.  4. You may be given a tetanus shot if needed.  5. Don't suck on the wound. This may introduce more bacteria.  Follow-up care  Follow up with your healthcare provider, or this facility as directed.  When to seek medical advice  Call your healthcare provider right away if you have any of these:  · Spreading redness  · Increased pain or swelling  · Fever of 100.4º F (38º C) or higher, or as directed by your healthcare provider  · Colored fluid or pus draining from the wound  · Any signs of nerve or tendon damage, such as inability to bend a joint or feel an area of skin  Date Last Reviewed: 6/1/2016  © 0589-8456 [employer]. [address] [place] [address], [place] [address]. All rights reserved. This information is not intended as a substitute for professional medical care. Always follow your healthcare professional's instructions.

## 2020-11-09 ENCOUNTER — OFFICE VISIT (OUTPATIENT)
Dept: URGENT CARE | Facility: CLINIC | Age: 55
End: 2020-11-09
Payer: OTHER MISCELLANEOUS

## 2020-11-09 VITALS
BODY MASS INDEX: 32.44 KG/M2 | SYSTOLIC BLOOD PRESSURE: 128 MMHG | HEIGHT: 64 IN | TEMPERATURE: 98 F | HEART RATE: 77 BPM | WEIGHT: 190 LBS | OXYGEN SATURATION: 98 % | DIASTOLIC BLOOD PRESSURE: 71 MMHG | RESPIRATION RATE: 16 BRPM
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
## 2020-11-09 PROCEDURE — 99214 PR OFFICE/OUTPT VISIT, EST, LEVL IV, 30-39 MIN: ICD-10-PCS | Mod: S$GLB,,, | Performed by: FAMILY MEDICINE

## 2020-11-09 PROCEDURE — 99214 OFFICE O/P EST MOD 30 MIN: CPT | Mod: S$GLB,,, | Performed by: FAMILY MEDICINE
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
## 2021-02-23 ENCOUNTER — TELEPHONE (OUTPATIENT)
Dept: FAMILY MEDICINE | Facility: CLINIC | Age: 56
End: 2021-02-23

## 2021-03-03 ENCOUNTER — PATIENT MESSAGE (OUTPATIENT)
Dept: FAMILY MEDICINE | Facility: CLINIC | Age: 56
End: 2021-03-03

## 2021-03-05 RX ORDER — AZELASTINE 1 MG/ML
1 SPRAY, METERED NASAL 2 TIMES DAILY
Qty: 30 ML | Refills: 2 | Status: SHIPPED | OUTPATIENT
Start: 2021-03-05 | End: 2021-04-22 | Stop reason: SDUPTHER

## 2021-03-05 RX ORDER — LORAZEPAM 0.5 MG/1
TABLET ORAL
Qty: 21 TABLET | Refills: 0 | Status: SHIPPED | OUTPATIENT
Start: 2021-03-05 | End: 2021-03-08 | Stop reason: SDUPTHER

## 2021-03-06 ENCOUNTER — PATIENT MESSAGE (OUTPATIENT)
Dept: FAMILY MEDICINE | Facility: CLINIC | Age: 56
End: 2021-03-06

## 2021-03-08 RX ORDER — LORAZEPAM 0.5 MG/1
TABLET ORAL
Qty: 21 TABLET | Refills: 0 | Status: SHIPPED | OUTPATIENT
Start: 2021-03-08 | End: 2021-07-09

## 2021-03-10 ENCOUNTER — PATIENT MESSAGE (OUTPATIENT)
Dept: FAMILY MEDICINE | Facility: CLINIC | Age: 56
End: 2021-03-10

## 2021-04-16 ENCOUNTER — PATIENT MESSAGE (OUTPATIENT)
Dept: FAMILY MEDICINE | Facility: CLINIC | Age: 56
End: 2021-04-16

## 2021-04-19 ENCOUNTER — PATIENT MESSAGE (OUTPATIENT)
Dept: FAMILY MEDICINE | Facility: CLINIC | Age: 56
End: 2021-04-19

## 2021-04-22 ENCOUNTER — HOSPITAL ENCOUNTER (OUTPATIENT)
Dept: RADIOLOGY | Facility: HOSPITAL | Age: 56
Discharge: HOME | End: 2021-04-22
Attending: INTERNAL MEDICINE
Payer: COMMERCIAL

## 2021-04-22 ENCOUNTER — OFFICE VISIT (OUTPATIENT)
Dept: FAMILY MEDICINE | Facility: CLINIC | Age: 56
End: 2021-04-22
Payer: COMMERCIAL

## 2021-04-22 VITALS
BODY MASS INDEX: 32.48 KG/M2 | DIASTOLIC BLOOD PRESSURE: 74 MMHG | WEIGHT: 190.25 LBS | HEIGHT: 64 IN | SYSTOLIC BLOOD PRESSURE: 110 MMHG

## 2021-04-22 DIAGNOSIS — Z12.31 VISIT FOR SCREENING MAMMOGRAM: ICD-10-CM

## 2021-04-22 DIAGNOSIS — M41.9 SCOLIOSIS, UNSPECIFIED SCOLIOSIS TYPE, UNSPECIFIED SPINAL REGION: ICD-10-CM

## 2021-04-22 DIAGNOSIS — G44.209 ACUTE NON INTRACTABLE TENSION-TYPE HEADACHE: ICD-10-CM

## 2021-04-22 DIAGNOSIS — F32.A DEPRESSION, UNSPECIFIED DEPRESSION TYPE: ICD-10-CM

## 2021-04-22 DIAGNOSIS — G44.209 ACUTE NON INTRACTABLE TENSION-TYPE HEADACHE: Primary | ICD-10-CM

## 2021-04-22 DIAGNOSIS — M54.9 UPPER BACK PAIN: ICD-10-CM

## 2021-04-22 DIAGNOSIS — R06.4 BREATHING ABNORMALLY DEEP: ICD-10-CM

## 2021-04-22 PROCEDURE — 1125F PR PAIN SEVERITY QUANTIFIED, PAIN PRESENT: ICD-10-PCS | Mod: S$GLB,,, | Performed by: INTERNAL MEDICINE

## 2021-04-22 PROCEDURE — 72070 X-RAY EXAM THORAC SPINE 2VWS: CPT | Mod: TC,PN

## 2021-04-22 PROCEDURE — 99214 OFFICE O/P EST MOD 30 MIN: CPT | Mod: 25,S$GLB,, | Performed by: INTERNAL MEDICINE

## 2021-04-22 PROCEDURE — 72070 XR THORACIC SPINE AP LATERAL: ICD-10-PCS | Mod: 26,,, | Performed by: RADIOLOGY

## 2021-04-22 PROCEDURE — 99999 PR PBB SHADOW E&M-EST. PATIENT-LVL III: ICD-10-PCS | Mod: PBBFAC,,, | Performed by: INTERNAL MEDICINE

## 2021-04-22 PROCEDURE — 72040 X-RAY EXAM NECK SPINE 2-3 VW: CPT | Mod: 26,,, | Performed by: RADIOLOGY

## 2021-04-22 PROCEDURE — 72040 X-RAY EXAM NECK SPINE 2-3 VW: CPT | Mod: TC,PN

## 2021-04-22 PROCEDURE — 72070 X-RAY EXAM THORAC SPINE 2VWS: CPT | Mod: 26,,, | Performed by: RADIOLOGY

## 2021-04-22 PROCEDURE — 96372 PR INJECTION,THERAP/PROPH/DIAG2ST, IM OR SUBCUT: ICD-10-PCS | Mod: S$GLB,,, | Performed by: INTERNAL MEDICINE

## 2021-04-22 PROCEDURE — 1125F AMNT PAIN NOTED PAIN PRSNT: CPT | Mod: S$GLB,,, | Performed by: INTERNAL MEDICINE

## 2021-04-22 PROCEDURE — 99214 PR OFFICE/OUTPT VISIT, EST, LEVL IV, 30-39 MIN: ICD-10-PCS | Mod: 25,S$GLB,, | Performed by: INTERNAL MEDICINE

## 2021-04-22 PROCEDURE — 99999 PR PBB SHADOW E&M-EST. PATIENT-LVL III: CPT | Mod: PBBFAC,,, | Performed by: INTERNAL MEDICINE

## 2021-04-22 PROCEDURE — 96372 THER/PROPH/DIAG INJ SC/IM: CPT | Mod: S$GLB,,, | Performed by: INTERNAL MEDICINE

## 2021-04-22 PROCEDURE — 72040 XR CERVICAL SPINE AP LATERAL: ICD-10-PCS | Mod: 26,,, | Performed by: RADIOLOGY

## 2021-04-22 RX ORDER — METHYLPREDNISOLONE 4 MG/1
TABLET ORAL
Qty: 7 PACKAGE | Refills: 0 | Status: SHIPPED | OUTPATIENT
Start: 2021-04-22 | End: 2021-06-29

## 2021-04-22 RX ORDER — METHYLPREDNISOLONE ACETATE 40 MG/ML
40 INJECTION, SUSPENSION INTRA-ARTICULAR; INTRALESIONAL; INTRAMUSCULAR; SOFT TISSUE
Status: COMPLETED | OUTPATIENT
Start: 2021-04-22 | End: 2021-04-22

## 2021-04-22 RX ORDER — FLUOXETINE HYDROCHLORIDE 20 MG/1
30 CAPSULE ORAL DAILY
COMMUNITY
End: 2021-04-22 | Stop reason: SDUPTHER

## 2021-04-22 RX ORDER — AZELASTINE 1 MG/ML
1 SPRAY, METERED NASAL 2 TIMES DAILY
Qty: 30 ML | Refills: 6 | Status: SHIPPED | OUTPATIENT
Start: 2021-04-22 | End: 2023-04-03

## 2021-04-22 RX ORDER — BETAMETHASONE SODIUM PHOSPHATE AND BETAMETHASONE ACETATE 3; 3 MG/ML; MG/ML
6 INJECTION, SUSPENSION INTRA-ARTICULAR; INTRALESIONAL; INTRAMUSCULAR; SOFT TISSUE ONCE
Status: CANCELLED | OUTPATIENT
Start: 2021-04-22 | End: 2021-04-22

## 2021-04-22 RX ORDER — ARIPIPRAZOLE 2 MG/1
2 TABLET ORAL DAILY
Qty: 30 TABLET | Refills: 3 | Status: SHIPPED | OUTPATIENT
Start: 2021-04-22 | End: 2021-06-29

## 2021-04-22 RX ORDER — ALBUTEROL SULFATE 90 UG/1
1 AEROSOL, METERED RESPIRATORY (INHALATION) EVERY 4 HOURS PRN
Qty: 18 G | Refills: 0 | Status: SHIPPED | OUTPATIENT
Start: 2021-04-22 | End: 2021-06-29

## 2021-04-22 RX ORDER — FLUOXETINE 10 MG/1
30 CAPSULE ORAL DAILY
Qty: 90 CAPSULE | Refills: 6 | Status: SHIPPED | OUTPATIENT
Start: 2021-04-22 | End: 2022-02-28

## 2021-04-22 RX ORDER — CYCLOBENZAPRINE HCL 10 MG
10 TABLET ORAL NIGHTLY
Qty: 30 TABLET | Refills: 6 | Status: SHIPPED | OUTPATIENT
Start: 2021-04-22 | End: 2022-05-12 | Stop reason: SDUPTHER

## 2021-04-22 RX ADMIN — METHYLPREDNISOLONE ACETATE 40 MG: 40 INJECTION, SUSPENSION INTRA-ARTICULAR; INTRALESIONAL; INTRAMUSCULAR; SOFT TISSUE at 10:04

## 2021-04-23 ENCOUNTER — PATIENT MESSAGE (OUTPATIENT)
Dept: FAMILY MEDICINE | Facility: CLINIC | Age: 56
End: 2021-04-23

## 2021-04-23 DIAGNOSIS — M54.9 UPPER BACK PAIN: Primary | ICD-10-CM

## 2021-05-03 ENCOUNTER — PATIENT MESSAGE (OUTPATIENT)
Dept: FAMILY MEDICINE | Facility: CLINIC | Age: 56
End: 2021-05-03

## 2021-05-04 ENCOUNTER — OFFICE VISIT (OUTPATIENT)
Dept: URGENT CARE | Facility: CLINIC | Age: 56
End: 2021-05-04
Payer: COMMERCIAL

## 2021-05-04 VITALS
HEART RATE: 86 BPM | HEIGHT: 64 IN | DIASTOLIC BLOOD PRESSURE: 64 MMHG | BODY MASS INDEX: 32.44 KG/M2 | TEMPERATURE: 98 F | RESPIRATION RATE: 15 BRPM | SYSTOLIC BLOOD PRESSURE: 129 MMHG | WEIGHT: 190 LBS | OXYGEN SATURATION: 98 %

## 2021-05-04 DIAGNOSIS — J06.9 VIRAL URI WITH COUGH: Primary | ICD-10-CM

## 2021-05-04 DIAGNOSIS — R05.9 COUGH: ICD-10-CM

## 2021-05-04 LAB
CTP QC/QA: YES
SARS-COV-2 RDRP RESP QL NAA+PROBE: NEGATIVE

## 2021-05-04 PROCEDURE — 99214 OFFICE O/P EST MOD 30 MIN: CPT | Mod: S$GLB,,, | Performed by: PHYSICIAN ASSISTANT

## 2021-05-04 PROCEDURE — U0002 COVID-19 LAB TEST NON-CDC: HCPCS | Mod: QW,S$GLB,, | Performed by: PHYSICIAN ASSISTANT

## 2021-05-04 PROCEDURE — U0002: ICD-10-PCS | Mod: QW,S$GLB,, | Performed by: PHYSICIAN ASSISTANT

## 2021-05-04 PROCEDURE — 99214 PR OFFICE/OUTPT VISIT, EST, LEVL IV, 30-39 MIN: ICD-10-PCS | Mod: S$GLB,,, | Performed by: PHYSICIAN ASSISTANT

## 2021-05-04 RX ORDER — BENZONATATE 200 MG/1
200 CAPSULE ORAL 3 TIMES DAILY PRN
Qty: 30 CAPSULE | Refills: 0 | Status: SHIPPED | OUTPATIENT
Start: 2021-05-04 | End: 2021-05-14

## 2021-05-09 ENCOUNTER — PATIENT MESSAGE (OUTPATIENT)
Dept: FAMILY MEDICINE | Facility: CLINIC | Age: 56
End: 2021-05-09

## 2021-05-12 ENCOUNTER — PATIENT MESSAGE (OUTPATIENT)
Dept: RESEARCH | Facility: HOSPITAL | Age: 56
End: 2021-05-12

## 2021-05-17 ENCOUNTER — PATIENT MESSAGE (OUTPATIENT)
Dept: FAMILY MEDICINE | Facility: CLINIC | Age: 56
End: 2021-05-17

## 2021-05-22 ENCOUNTER — PATIENT MESSAGE (OUTPATIENT)
Dept: FAMILY MEDICINE | Facility: CLINIC | Age: 56
End: 2021-05-22

## 2021-05-24 NOTE — TELEPHONE ENCOUNTER
[address] [facility], [address] [place] [address] [place]  Phone:  [phone]  Fax: [phone]            Patient:  Radha Kline    Date:  05/24/2021    Prescription for:  Vascular medicine/surgery consult    Diagnosis:  Critical limb ischemia; left lower extremity        Provider signature:  ___________________________________    Mariel Oliver NP  NPI: [id number] Patient was given their negative test results and was informed if symptoms worsen to follow up with their PCP.

## 2021-05-26 ENCOUNTER — TELEPHONE (OUTPATIENT)
Dept: FAMILY MEDICINE | Facility: CLINIC | Age: 56
End: 2021-05-26

## 2021-05-26 ENCOUNTER — PATIENT MESSAGE (OUTPATIENT)
Dept: FAMILY MEDICINE | Facility: CLINIC | Age: 56
End: 2021-05-26

## 2021-05-27 ENCOUNTER — PATIENT MESSAGE (OUTPATIENT)
Dept: FAMILY MEDICINE | Facility: CLINIC | Age: 56
End: 2021-05-27

## 2021-05-27 RX ORDER — VENLAFAXINE HYDROCHLORIDE 37.5 MG/1
37.5 CAPSULE, EXTENDED RELEASE ORAL DAILY
Qty: 30 CAPSULE | Refills: 2 | Status: SHIPPED | OUTPATIENT
Start: 2021-05-27 | End: 2021-06-29

## 2021-06-02 ENCOUNTER — PATIENT MESSAGE (OUTPATIENT)
Dept: FAMILY MEDICINE | Facility: CLINIC | Age: 56
End: 2021-06-02

## 2021-06-03 ENCOUNTER — PATIENT MESSAGE (OUTPATIENT)
Dept: FAMILY MEDICINE | Facility: CLINIC | Age: 56
End: 2021-06-03

## 2021-06-04 ENCOUNTER — PATIENT MESSAGE (OUTPATIENT)
Dept: FAMILY MEDICINE | Facility: CLINIC | Age: 56
End: 2021-06-04

## 2021-06-15 LAB
HUMAN PAPILLOMAVIRUS (HPV): NORMAL
PAP SMEAR: NORMAL

## 2021-06-16 DIAGNOSIS — Z12.11 COLON CANCER SCREENING: ICD-10-CM

## 2021-06-23 DIAGNOSIS — Z12.11 COLON CANCER SCREENING: ICD-10-CM

## 2021-06-29 ENCOUNTER — OFFICE VISIT (OUTPATIENT)
Dept: FAMILY MEDICINE | Facility: CLINIC | Age: 56
End: 2021-06-29
Payer: COMMERCIAL

## 2021-06-29 VITALS
DIASTOLIC BLOOD PRESSURE: 76 MMHG | BODY MASS INDEX: 31.24 KG/M2 | RESPIRATION RATE: 17 BRPM | SYSTOLIC BLOOD PRESSURE: 124 MMHG | HEIGHT: 64 IN | WEIGHT: 183 LBS | HEART RATE: 74 BPM

## 2021-06-29 DIAGNOSIS — R21 RASH OF FACE: Primary | ICD-10-CM

## 2021-06-29 PROCEDURE — 99999 PR PBB SHADOW E&M-EST. PATIENT-LVL III: ICD-10-PCS | Mod: PBBFAC,,, | Performed by: INTERNAL MEDICINE

## 2021-06-29 PROCEDURE — 99213 PR OFFICE/OUTPT VISIT, EST, LEVL III, 20-29 MIN: ICD-10-PCS | Mod: S$GLB,,, | Performed by: INTERNAL MEDICINE

## 2021-06-29 PROCEDURE — 99999 PR PBB SHADOW E&M-EST. PATIENT-LVL III: CPT | Mod: PBBFAC,,, | Performed by: INTERNAL MEDICINE

## 2021-06-29 PROCEDURE — 99213 OFFICE O/P EST LOW 20 MIN: CPT | Mod: S$GLB,,, | Performed by: INTERNAL MEDICINE

## 2021-06-29 RX ORDER — SPIRONOLACTONE 100 MG/1
TABLET, FILM COATED ORAL
COMMUNITY
End: 2021-06-29

## 2021-06-29 RX ORDER — TRIAMCINOLONE ACETONIDE 1 MG/G
CREAM TOPICAL 2 TIMES DAILY
Qty: 30 G | Refills: 2 | Status: SHIPPED | OUTPATIENT
Start: 2021-06-29 | End: 2021-12-22

## 2021-06-29 RX ORDER — ESTRADIOL 0.1 MG/G
CREAM VAGINAL
COMMUNITY
Start: 2021-06-17 | End: 2022-05-12

## 2021-06-30 ENCOUNTER — PATIENT OUTREACH (OUTPATIENT)
Dept: ADMINISTRATIVE | Facility: HOSPITAL | Age: 56
End: 2021-06-30

## 2021-07-06 ENCOUNTER — PATIENT OUTREACH (OUTPATIENT)
Dept: ADMINISTRATIVE | Facility: HOSPITAL | Age: 56
End: 2021-07-06

## 2021-07-07 ENCOUNTER — PATIENT MESSAGE (OUTPATIENT)
Dept: ADMINISTRATIVE | Facility: HOSPITAL | Age: 56
End: 2021-07-07

## 2021-08-15 ENCOUNTER — OFFICE VISIT (OUTPATIENT)
Dept: URGENT CARE | Facility: CLINIC | Age: 56
End: 2021-08-15
Payer: COMMERCIAL

## 2021-08-16 ENCOUNTER — OFFICE VISIT (OUTPATIENT)
Dept: URGENT CARE | Facility: CLINIC | Age: 56
End: 2021-08-16
Payer: COMMERCIAL

## 2021-08-16 VITALS
HEIGHT: 64 IN | SYSTOLIC BLOOD PRESSURE: 122 MMHG | TEMPERATURE: 97 F | HEART RATE: 64 BPM | DIASTOLIC BLOOD PRESSURE: 82 MMHG | WEIGHT: 185 LBS | BODY MASS INDEX: 31.58 KG/M2 | OXYGEN SATURATION: 97 %

## 2021-08-16 DIAGNOSIS — Z20.822 EXPOSURE TO COVID-19 VIRUS: ICD-10-CM

## 2021-08-16 DIAGNOSIS — B34.9 NONSPECIFIC SYNDROME SUGGESTIVE OF VIRAL ILLNESS: Primary | ICD-10-CM

## 2021-08-16 LAB
CTP QC/QA: YES
SARS-COV-2 RDRP RESP QL NAA+PROBE: NEGATIVE

## 2021-08-16 PROCEDURE — U0002 COVID-19 LAB TEST NON-CDC: HCPCS | Mod: QW,S$GLB,, | Performed by: PHYSICIAN ASSISTANT

## 2021-08-16 PROCEDURE — 99214 PR OFFICE/OUTPT VISIT, EST, LEVL IV, 30-39 MIN: ICD-10-PCS | Mod: S$GLB,,, | Performed by: PHYSICIAN ASSISTANT

## 2021-08-16 PROCEDURE — U0002: ICD-10-PCS | Mod: QW,S$GLB,, | Performed by: PHYSICIAN ASSISTANT

## 2021-08-16 PROCEDURE — 99214 OFFICE O/P EST MOD 30 MIN: CPT | Mod: S$GLB,,, | Performed by: PHYSICIAN ASSISTANT

## 2021-12-22 ENCOUNTER — OFFICE VISIT (OUTPATIENT)
Dept: FAMILY MEDICINE | Facility: CLINIC | Age: 56
End: 2021-12-22
Payer: COMMERCIAL

## 2021-12-22 ENCOUNTER — HOSPITAL ENCOUNTER (OUTPATIENT)
Dept: RADIOLOGY | Facility: HOSPITAL | Age: 56
Discharge: HOME | End: 2021-12-22
Attending: INTERNAL MEDICINE
Payer: COMMERCIAL

## 2021-12-22 ENCOUNTER — PATIENT OUTREACH (OUTPATIENT)
Dept: ADMINISTRATIVE | Facility: HOSPITAL | Age: 56
End: 2021-12-22
Payer: COMMERCIAL

## 2021-12-22 VITALS
SYSTOLIC BLOOD PRESSURE: 112 MMHG | DIASTOLIC BLOOD PRESSURE: 70 MMHG | WEIGHT: 192.81 LBS | BODY MASS INDEX: 32.92 KG/M2 | HEIGHT: 64 IN

## 2021-12-22 DIAGNOSIS — R10.10 UPPER ABDOMINAL PAIN: ICD-10-CM

## 2021-12-22 DIAGNOSIS — R07.81 RIB PAIN ON LEFT SIDE: ICD-10-CM

## 2021-12-22 DIAGNOSIS — F32.A DEPRESSION, UNSPECIFIED DEPRESSION TYPE: ICD-10-CM

## 2021-12-22 DIAGNOSIS — M77.11 LATERAL EPICONDYLITIS OF RIGHT ELBOW: ICD-10-CM

## 2021-12-22 DIAGNOSIS — E66.9 OBESITY (BMI 30-39.9): ICD-10-CM

## 2021-12-22 DIAGNOSIS — Z00.00 WELLNESS EXAMINATION: Primary | ICD-10-CM

## 2021-12-22 DIAGNOSIS — F41.9 ANXIETY: ICD-10-CM

## 2021-12-22 PROCEDURE — 71100 X-RAY EXAM RIBS UNI 2 VIEWS: CPT | Mod: TC,PN,LT

## 2021-12-22 PROCEDURE — 99999 PR PBB SHADOW E&M-EST. PATIENT-LVL IV: ICD-10-PCS | Mod: PBBFAC,,, | Performed by: INTERNAL MEDICINE

## 2021-12-22 PROCEDURE — 99213 PR OFFICE/OUTPT VISIT, EST, LEVL III, 20-29 MIN: ICD-10-PCS | Mod: S$GLB,,, | Performed by: INTERNAL MEDICINE

## 2021-12-22 PROCEDURE — 99213 OFFICE O/P EST LOW 20 MIN: CPT | Mod: S$GLB,,, | Performed by: INTERNAL MEDICINE

## 2021-12-22 PROCEDURE — 99999 PR PBB SHADOW E&M-EST. PATIENT-LVL IV: CPT | Mod: PBBFAC,,, | Performed by: INTERNAL MEDICINE

## 2021-12-22 PROCEDURE — 71100 X-RAY EXAM RIBS UNI 2 VIEWS: CPT | Mod: 26,LT,, | Performed by: RADIOLOGY

## 2021-12-22 PROCEDURE — 71100 XR RIBS 2 VIEW LEFT: ICD-10-PCS | Mod: 26,LT,, | Performed by: RADIOLOGY

## 2021-12-22 RX ORDER — LORAZEPAM 0.5 MG/1
TABLET ORAL
Qty: 20 TABLET | Refills: 0 | Status: SHIPPED | OUTPATIENT
Start: 2021-12-22 | End: 2022-04-25 | Stop reason: SDUPTHER

## 2021-12-22 RX ORDER — DICLOFENAC SODIUM 10 MG/G
2 GEL TOPICAL DAILY
Qty: 100 G | Refills: 0 | Status: SHIPPED | OUTPATIENT
Start: 2021-12-22 | End: 2022-05-12

## 2021-12-29 ENCOUNTER — PATIENT MESSAGE (OUTPATIENT)
Dept: FAMILY MEDICINE | Facility: CLINIC | Age: 56
End: 2021-12-29
Payer: COMMERCIAL

## 2021-12-29 RX ORDER — MECLIZINE HYDROCHLORIDE 25 MG/1
25 TABLET ORAL 3 TIMES DAILY PRN
Qty: 30 TABLET | Refills: 0 | Status: SHIPPED | OUTPATIENT
Start: 2021-12-29 | End: 2022-07-28 | Stop reason: SDUPTHER

## 2022-01-03 ENCOUNTER — OFFICE VISIT (OUTPATIENT)
Dept: FAMILY MEDICINE | Facility: CLINIC | Age: 57
End: 2022-01-03
Payer: COMMERCIAL

## 2022-01-03 ENCOUNTER — PATIENT MESSAGE (OUTPATIENT)
Dept: FAMILY MEDICINE | Facility: CLINIC | Age: 57
End: 2022-01-03
Payer: COMMERCIAL

## 2022-01-03 DIAGNOSIS — R42 VERTIGO: Primary | ICD-10-CM

## 2022-01-03 DIAGNOSIS — J34.89 SINUS PRESSURE: ICD-10-CM

## 2022-01-03 PROCEDURE — 99213 OFFICE O/P EST LOW 20 MIN: CPT | Mod: 95,,, | Performed by: NURSE PRACTITIONER

## 2022-01-03 PROCEDURE — 99213 PR OFFICE/OUTPT VISIT, EST, LEVL III, 20-29 MIN: ICD-10-PCS | Mod: 95,,, | Performed by: NURSE PRACTITIONER

## 2022-01-03 RX ORDER — AMOXICILLIN AND CLAVULANATE POTASSIUM 875; 125 MG/1; MG/1
1 TABLET, FILM COATED ORAL 2 TIMES DAILY
Qty: 20 TABLET | Refills: 0 | Status: SHIPPED | OUTPATIENT
Start: 2022-01-03 | End: 2022-01-13

## 2022-01-03 NOTE — PATIENT INSTRUCTIONS
"Patient Education       Vertigo (a Type of Dizziness)   The Basics   Written by the doctors and editors at [facility]   What are dizziness and vertigo? -- Dizziness is a feeling that is sometimes hard to describe. It often makes you feel like you are about to fall or pass out. Dizziness can also cause you to feel lightheaded or make it hard for you to walk straight.  Vertigo is a type of dizziness that makes you feel like you are spinning, swaying, or tilting, or like the room is moving around you. These feelings come and go, and might last seconds, hours, or days. You might feel worse when you move your head, change positions, cough, or sneeze.  Some people with vertigo have trouble walking. Some people with vertigo have nausea and might vomit.  What causes vertigo? -- The most common causes of vertigo include:  · Inner ear problems - Deep inside the ear, there is a small network of tubes that are filled with fluid. Floating inside that fluid are special calcium deposits. Together, these tubes and deposits make up the "vestibular system." This system tells the brain what position the body is in. It also helps keep you balanced (figure 1).  Problems that affect the inner ear and can lead to vertigo include:  ? Benign paroxysmal positional vertigo - In this condition, extra calcium deposits form in the inner ear. This can lead to short episodes of vertigo that happen when you move your head in certain ways.  ? Meniere disease - This is a condition in which fluid builds up inside the inner ear. This causes vertigo as well as hearing loss and ringing in one or both ears.   ? Vestibular neuritis - This is sometimes caused by a virus which can affect the inner ear or the nerve in the inner ear. It is sometimes called "labyrinthitis." People with this condition have vertigo that comes on quickly and can last several days. They also often feel very sick and off balance.  ? Head injury - Even a minor head injury can cause " inner ear damage and vertigo. This is usually temporary.  ? Vestibular migraine - People who get migraines, which are a type of headache, can sometimes have episodes of vertigo. This can happen with or without a headache.  · Other problems - Other things that can cause vertigo include:  ? Certain medicines  ? Problems that affect the brain, such as stroke or multiple sclerosis  Should I see a doctor or nurse? -- See your doctor or nurse right away if you have vertigo and:  · Have a new or severe headache  · Have a fever higher than 100.4ºF (38ºC)  · Start to see double or have trouble seeing clearly  · Have trouble speaking or hearing  · Have weakness in an arm or leg or your face droops to one side  · Cannot walk on your own  · Pass out  · Have numbness or tingling  · Have chest pain  · Cannot stop vomiting  You should also see your doctor or nurse if you have vertigo that lasts for several minutes or more and you:  · Are older than 60  · Had a stroke in the past  · Are at risk for having a stroke, for example because you have diabetes or you smoke  If you have dizziness or vertigo that comes and goes but you do not have any of the problems listed above, you should still make an appointment with your doctor or nurse.  Will I need tests? -- Maybe. Your doctor will start by learning about your symptoms and doing an exam. During the exam, he or she will check:  · Your hearing  · How you walk and keep your balance  · How your eyes work when you watch a moving object, and when your head is turned from side to side  Depending on what your doctor finds during the exam, he or she might order more tests to better understand your hearing or balance problems. In some cases, the doctor will order an MRI of your brain. An MRI is an imaging test that creates pictures of the inside of your body.  How is vertigo treated? -- If your doctor knows what is causing your vertigo, he or she will probably try to treat that problem  "directly. For instance, if you have calcium deposits in your inner ear, the doctor might try to get them out by moving your head in a specific way.  Your doctor can also give you medicines that might help your vertigo and relieve nausea and vomiting.  If your vertigo is really bad, your doctor might also suggest a treatment called "balance rehabilitation." This treatment teaches you exercises that can help you cope with your vertigo.  What can I do on my own to deal with my vertigo? -- If you have trouble standing or walking because of vertigo, you are at risk of falling. To reduce the risk of falls, make your home as safe as possible. Get rid of loose electrical cords, clutter, and slippery rugs. Also, make sure that you wear sturdy, non-slip shoes, and that your walkways are clear and well lit.  All topics are updated as new evidence becomes available and our peer review process is complete.  This topic retrieved from [employer] on: Sep 21, 2021.  Topic 28677 Version 5.0  Release: 29.4.2 - C29.263  © 2021 UpToDate, Inc. and/or its affiliates. All rights reserved.  figure 1: The vestibular system     Deep inside the ear there is a small network of tubes that are filled with fluid, called the "semicircular canals." Also deep inside the ear are special calcium deposits, called "otolith organs." Together these tubes and deposits make up the "vestibular system." This system tells the brain what position the head is in and how it is moving. The vestibular system helps keep you balanced, especially when you are standing or walking.  Graphic 42800 Version 10.0    Consumer Information Use and Disclaimer   This information is not specific medical advice and does not replace information you receive from your health care provider. This is only a brief summary of general information. It does NOT include all information about conditions, illnesses, injuries, tests, procedures, treatments, therapies, discharge instructions or " life-style choices that may apply to you. You must talk with your health care provider for complete information about your health and treatment options. This information should not be used to decide whether or not to accept your health care provider's advice, instructions or recommendations. Only your health care provider has the knowledge and training to provide advice that is right for you. The use of this information is governed by the [employer] End User License Agreement, available at https://www.[employer]/en/solutions/[employer]/about/bang.The use of [employer] content is governed by the [employer] Terms of Use. ©2021 UpToDate, Inc. All rights reserved.  Copyright   © 2021 UpToDate, Inc. and/or its affiliates. All rights reserved.      Trial of antibiotic to see if that relieves the sinus pressure.  Continue all your treatment measures for vertigo EXCEPT typically they want you to be off of the antivert (meclizine) 48 hrs prior to the appointment.  Call [facility] tomorrow to let them know about the urgent referral.    If you  are worse at any time, go for an in-person evaluation to your ENT office, an URGENT CARE or the Emergency Department.    You have been evaluated today via a telehealth visit.  While providing good convenience and increased access to care, it is not always possible to fully evaluate a medical problem through type of patient care encounter.  If your symptoms change or worsen or the treatment is not effective, please follow-up with an in-person visit to your primary care center, at an urgent care or the emergency room.  Thank you for choosing [facility]! Bev McCoy, FLYNN, CNP, FNP-BC  [facility]

## 2022-01-03 NOTE — Clinical Note
Philippe Hahn MD,  I saw your patient today in [facility]. If you have any questions, please do not hesitate to contact me.  Thank you!  ESTHER Gold, [facility]

## 2022-01-03 NOTE — PROGRESS NOTES
Tori Quinn is a 56 y.o. female patient of Philippe Hahn MD who presents to the clinic today for a Virtual Visit.    The patient location is: [place], [place]  The chief complaint leading to consultation is: cough, sinus sxs, vertigo  Visit type: audiovisual  Total time spent with patient: 18 minutes.  Each patient to whom he or she provides medical services by telemedicine is:  (1) informed of the relationship between the physician and patient and the respective role of any other health care provider with respect to management of the patient; and (2) notified that he or she may decline to receive medical services by telemedicine and may withdraw from such care at any time.    20 minutes of total time spent on the encounter, which includes face to face time and non-face to face time preparing to see the patient (eg, review of tests), Obtaining and/or reviewing separately obtained history, Documenting clinical information in the electronic or other health record, Independently interpreting results (not separately reported) and communicating results to the patient/family/caregiver, or Care coordination (not separately reported).     HPI    Pt, who is not known to me, reports a new problem to me: 5 days ago she started having vertigo along with her other sxs.  Has to hug the walls to walk.   Still has a lot of drainage, wet and dry cough alernating.  No runny nose or stuffy nose but sinus pressure.  Cough occ prod of white mucus.   No fever. No known exposure.    Answers for HPI/ROS submitted by the patient on 1/3/2022  activity change: No  hearing loss: No  trouble swallowing: No  eye discharge: No  chest tightness: No  wheezing: Yes  chest pain: No  palpitations: No  constipation: Yes--has IBS (alternates diarrhea and constipation)  vomiting: No  diarrhea: Yes  difficulty urinating: No  hematuria: No  headaches: Yes  confusion: Yes--going through menopause and thinks her forgetfulness may be r/t  that.  dysphoric mood: Yes-frustrated, almost angry, r/t the vertigo sxs.        These symptoms began 8 days ago and status is worsening..     Symptoms are + acute.    Pt denies the following symptoms:  fever    Aggravating factors include walking, sitting, any movement. .    Relieving factors include nothing .    OTC Medications tried are nothing.    Prescription medications taken for symptoms are meclizine.    Pertinent medical history:  H/o same.  Has had all the testing, including all the testing that makes her want to throw up.  Has learned to do the maneuvers and has had to do this often.  This time it's not working.  No change in vision..    Smoking status:  nonsmoker    ROS    Constitutional:   No  fever.    Head:   No headache  Ears:   No pain.  Eyes:  No sxs  Nose:   No sinus pain but feels pressure like her sinuses need to pop., + congestion, no runny nose, no post nasal drip.  Throat:  No ST pain.    Heart:  No palpitations, chest pain.    Lungs:  No difficulty breathing, + coughing.    GI/:  No sxs    MS:  No new bone, joint or muscle problems.    Skin:  No rashes, itching.    Past Medical History:   Diagnosis Date    Anxiety and depression     Colon polyps 03/26/2019    Hair loss disorder        Current Outpatient Medications:     azelastine (ASTELIN) 137 mcg (0.1 %) nasal spray, 1 spray (137 mcg total) by Nasal route 2 (two) times daily., Disp: 30 mL, Rfl: 6    cyclobenzaprine (FLEXERIL) 10 MG tablet, Take 1 tablet (10 mg total) by mouth every evening., Disp: 30 tablet, Rfl: 6    diclofenac sodium (ARTHRITIS PAIN, DICLOFENAC,) 1 % Gel, Apply 2 g topically once daily., Disp: 100 g, Rfl: 0    estradioL (ESTRACE) 0.01 % (0.1 mg/gram) vaginal cream, INSERT 1 GRAM VAGINALLY 1 TO 2 TIMES EVERY WEEK, Disp: , Rfl:     FLUoxetine 10 MG capsule, Take 3 capsules (30 mg total) by mouth once daily. Patient does a total of 30 mg 10 and 20, Disp: 90 capsule, Rfl: 6    LORazepam (ATIVAN) 0.5 MG tablet,  TAKE 1 TABLET BY MOUTH ONCE DAILY AS NEEDED UP TO 5 TIMES WEEKLY (SEDATION PRECAUTIONS), Disp: 20 tablet, Rfl: 0    meclizine (ANTIVERT) 25 mg tablet, Take 1 tablet (25 mg total) by mouth 3 (three) times daily as needed for Dizziness., Disp: 30 tablet, Rfl: 0    spironolactone (ALDACTONE) 100 MG tablet, Take 200 mg by mouth once daily. , Disp: , Rfl:       PHYSICAL EXAM    There were no vitals filed for this visit.  Vital signs not taken per patient.  Patient's questionnaire (if available), medications & PMH all reviewed today.    Gen:  Alert, coop 56 y.o. female patient in no acute distress, is not ill-appearing.           Well developed, well-nourished  Psych:   Behavior and affect appropriate for the situation and setting.  HENT:   Normocephalic, atraumatic.  Symmetrical facial movements.    Eyes without visible discharge or swelling.  Pressure but no pain on palpation to frontal sinuses on patient self-exam.  Pressure but no pain on palpation to maxillary sinuses on patient self-exam.  No sneezing during the visit.  Voice steady, no hoarseness noted.  Resp:   Respiratory effort symmetrical.  No retractions  No increased work of breathing  No audible wheezes  Talks in full sentences.  No coughing during the interaction today.  CV:   No ana oral cyanosis  MSK:  Head and upper extremity movements smooth and even.  Neuro:  Responds promptly to questions showing good insight.  Skin:   No observed rashes/bruises    Vertigo  -     Ambulatory referral/consult to ENT; Future; Expected date: 01/10/2022    Sinus pressure  -     amoxicillin-clavulanate 875-125mg (AUGMENTIN) 875-125 mg per tablet; Take 1 tablet by mouth 2 (two) times daily. for 10 days  Dispense: 20 tablet; Refill: 0          Pt today presents with 6 days of vertigo that is not resolving or improving with meclizine or the maneuvers she uses, which would typically resolve the problem.  She is having sinus pressure, as well.  Describes it as feeling like  the sinuses need to popl  Using a neti pot regularly .  Additionally, she has had vertigo for a long time and had testing.  No significant diagnosis has been made, she states.  She is an established pt of [facility]-Dr. Lane.  Covid and flu tests are negative.      This is a new problem to me.  No work up is planned.   Pt advised to perform comfort measures recommended on patient instruction sheet, which were reviewed at the time of the visit..    Advised to call Dr. Lane's office tomorrow and let them know she has an urgent referral because of no resolution of her vertigo with usual measures.  If worse or concerns, the patient is advised to call for advise to this office or the PCP office or call [facility] or go to the nearest URGENT CARE or ER.  Explained exam findings, diagnosis and treatment plan to patient.  Questions answered and patient states understanding.

## 2022-01-31 ENCOUNTER — PATIENT MESSAGE (OUTPATIENT)
Dept: FAMILY MEDICINE | Facility: CLINIC | Age: 57
End: 2022-01-31
Payer: COMMERCIAL

## 2022-02-25 ENCOUNTER — PATIENT MESSAGE (OUTPATIENT)
Dept: FAMILY MEDICINE | Facility: CLINIC | Age: 57
End: 2022-02-25
Payer: COMMERCIAL

## 2022-02-26 ENCOUNTER — OFFICE VISIT (OUTPATIENT)
Dept: URGENT CARE | Facility: CLINIC | Age: 57
End: 2022-02-26
Payer: COMMERCIAL

## 2022-02-26 VITALS
TEMPERATURE: 98 F | WEIGHT: 190 LBS | OXYGEN SATURATION: 97 % | BODY MASS INDEX: 32.44 KG/M2 | RESPIRATION RATE: 15 BRPM | SYSTOLIC BLOOD PRESSURE: 135 MMHG | HEART RATE: 75 BPM | HEIGHT: 64 IN | DIASTOLIC BLOOD PRESSURE: 80 MMHG

## 2022-02-26 DIAGNOSIS — H57.89 RED EYE: Primary | ICD-10-CM

## 2022-02-26 PROCEDURE — 99213 OFFICE O/P EST LOW 20 MIN: CPT | Mod: S$GLB,,, | Performed by: PHYSICIAN ASSISTANT

## 2022-02-26 PROCEDURE — 99213 PR OFFICE/OUTPT VISIT, EST, LEVL III, 20-29 MIN: ICD-10-PCS | Mod: S$GLB,,, | Performed by: PHYSICIAN ASSISTANT

## 2022-02-26 RX ORDER — ERYTHROMYCIN 5 MG/G
OINTMENT OPHTHALMIC 3 TIMES DAILY
Qty: 3.5 G | Refills: 0 | Status: SHIPPED | OUTPATIENT
Start: 2022-02-26 | End: 2022-05-12

## 2022-02-26 NOTE — PROGRESS NOTES
"Subjective:       Patient ID: Tori Quinn is a 56 y.o. female.    Vitals:  height is 5' 4" (1.626 m) and weight is 86.2 kg (190 lb). Her oral temperature is 98.4 °F (36.9 °C). Her blood pressure is 135/80 and her pulse is 75. Her respiration is 15 and oxygen saturation is 97%.     Chief Complaint: Other Misc (Entered by patient)    Pt states yesterday she noticed her RIGHT eye was very sensitive to light, and very painful. By the end of the day her eye was blood shot read and had a ad headache. Symptoms have improved as of today. She denies discharge, injury, and any history of eye problems including glaucoma.    Eye Pain   The right eye is affected. This is a new problem. The current episode started yesterday. The problem occurs constantly. The problem has been unchanged. There was no injury mechanism. The pain is at a severity of 3/10. The pain is mild. There is no known exposure to pink eye. She does not wear contacts. Associated symptoms include eye redness. Pertinent negatives include no blurred vision, eye discharge, double vision, fever, foreign body sensation, itching, nausea, photophobia, recent URI or vomiting. She has tried nothing for the symptoms. The treatment provided no relief.       Constitution: Negative for fever.   Neck: Negative for painful lymph nodes.   Eyes: Positive for eye pain and eye redness. Negative for eye discharge, eye itching, photophobia, double vision and blurred vision.   Gastrointestinal: Negative for nausea and vomiting.   Neurological: Negative for dizziness and headaches.   Hematologic/Lymphatic: Negative for swollen lymph nodes.       Objective:      Physical Exam   Constitutional: She is oriented to person, place, and time. She appears well-developed.  Non-toxic appearance. She does not appear ill. No distress.   HENT:   Head: Normocephalic and atraumatic.   Ears:   Right Ear: Hearing and external ear normal.   Left Ear: Hearing and external ear normal.   Eyes: EOM " are normal. Pupils are equal, round, and reactive to light. Right eye exhibits no chemosis, no discharge and no hordeolum. No foreign body present in the right eye. Right conjunctiva is injected (no obvious ciliary flush). Right conjunctiva has no hemorrhage. Right eye exhibits normal extraocular motion. Left eye exhibits normal extraocular motion.   Slit lamp exam:       The right eye shows no corneal abrasion and no fluorescein uptake.        Comments: Verbal consent obtained prior to procedure.  Fluorescein with anesthetic applied to LR eye.  No foreign body with lid sweep.  No fluorescein uptake.  Pt tolerated procedure well.     Neck: Neck supple. No neck rigidity present.   Cardiovascular: Normal rate and regular rhythm.   Pulmonary/Chest: Effort normal and breath sounds normal. No respiratory distress.   Lymphadenopathy:        Head (right side): No preauricular and no posterior auricular adenopathy present.     She has no cervical adenopathy.   Neurological: She is alert and oriented to person, place, and time.   Skin: Skin is warm, dry and not diaphoretic.   Psychiatric: Her speech is normal and behavior is normal. Mood normal.   Nursing note and vitals reviewed.        Assessment:       1. Red eye          Plan:       Past medical hx, family hx, social hx, and current medications were provided by the patient and were reviewed. Diagnostics performed today were discussed. Dx and tx were discussed with the patient. Discussed mild idiopathic conjunctivitis vs mild idiopathic iritis; pt agreed to see ophthalmology if persistence or worsening. Pt reporting improvement in symptoms in any case, and this course will likely continue. ER precautions discussed. Pt verbalized understanding of all discussed, and agreed.    Red eye    Other orders  -     erythromycin (ROMYCIN) ophthalmic ointment; Place into the right eye 3 (three) times daily.  Dispense: 3.5 g; Refill: 0      Patient Instructions   · Follow up with your  primary care if symptoms do not improve, or you may return here at any time.  · If you were referred to a specialist, please follow up with that specialty.  · If you were prescribed antibiotics, please take them to completion.  · If you were prescribed a narcotic or any medication with sedative effects, do not drive or operate heavy equipment or machinery while taking these medications.  · You must understand that you have received treatment at an Urgent Care facility only, and that you may be released before all of your medical problems are known or treated. Urgent Care facilities are not equipped to handle life threatening emergencies. It is recommended that you seek care at an Emergency Department for further evaluation of worsening or concerning symptoms, or possibly life threatening conditions as discussed.                                        If you  smoke, please stop smoking        Eye Instructions  If your condition worsens or fails to improve we recommend that you receive another evaluation with an Ophthalmologist, your PCP, or you may return here at any time. You must understand that you've received an urgent care treatment only and that you may be released before all your medical problems are known or treated.   Use the eye drops as prescribed while awake initially. Use the eye drops for 24 hours after the last day of eye symptoms.   Do not wear your contact lens ( if you use them) for the duration of the prescribed treatment. Throw away the contacts, contact solution and carrying case you were using and start with new material. You may also need to throw away any eye makeup/mascara that you used while your eye was irritated.  If you develop increased pain, or have any visual change or disturbance seek medical care immediately either with your ophthalmologist or the ER.

## 2022-02-26 NOTE — PATIENT INSTRUCTIONS
Follow up with your primary care if symptoms do not improve, or you may return here at any time.  If you were referred to a specialist, please follow up with that specialty.  If you were prescribed antibiotics, please take them to completion.  If you were prescribed a narcotic or any medication with sedative effects, do not drive or operate heavy equipment or machinery while taking these medications.  You must understand that you have received treatment at an Urgent Care facility only, and that you may be released before all of your medical problems are known or treated. Urgent Care facilities are not equipped to handle life threatening emergencies. It is recommended that you seek care at an Emergency Department for further evaluation of worsening or concerning symptoms, or possibly life threatening conditions as discussed.                                        If you  smoke, please stop smoking        Eye Instructions  If your condition worsens or fails to improve we recommend that you receive another evaluation with an Ophthalmologist, your PCP, or you may return here at any time. You must understand that you've received an urgent care treatment only and that you may be released before all your medical problems are known or treated.   Use the eye drops as prescribed while awake initially. Use the eye drops for 24 hours after the last day of eye symptoms.   Do not wear your contact lens ( if you use them) for the duration of the prescribed treatment. Throw away the contacts, contact solution and carrying case you were using and start with new material. You may also need to throw away any eye makeup/mascara that you used while your eye was irritated.  If you develop increased pain, or have any visual change or disturbance seek medical care immediately either with your ophthalmologist or the ER.

## 2022-02-28 RX ORDER — FLUOXETINE 10 MG/1
CAPSULE ORAL
Qty: 270 CAPSULE | Refills: 3 | Status: SHIPPED | OUTPATIENT
Start: 2022-02-28 | End: 2022-03-02 | Stop reason: SDUPTHER

## 2022-02-28 NOTE — TELEPHONE ENCOUNTER
No new care gaps identified.  Powered by [employer]. Reference number: [id number].   2/28/2022 5:31:32 AM CST

## 2022-02-28 NOTE — TELEPHONE ENCOUNTER
Refill Authorization Note   Tori Quinn  is requesting a refill authorization.  Brief Assessment and Rationale for Refill:  Approve     Medication Therapy Plan:       Medication Reconciliation Completed: No   Comments:   --->Care Gap information included below if applicable.       Requested Prescriptions   Pending Prescriptions Disp Refills    FLUoxetine 10 MG capsule [Pharmacy Med Name: FLUoxetine HCl 10 MG Oral Capsule] 270 capsule 3     Sig: TAKE 3 CAPSULES BY MOUTH ONCE DAILY       Psychiatry:  Antidepressants - SSRI Passed - 2/28/2022  5:31 AM        Passed - Patient is at least 18 years old        Passed - Valid encounter within last 15 months     Recent Visits  Date Type Provider Dept   12/22/21 Office Visit Philippe Hahn MD [facility]   04/22/21 Office Visit Philippe Hahn MD [facility]   Showing recent visits within past 720 days and meeting all other requirements  Future Appointments  No visits were found meeting these conditions.  Showing future appointments within next 150 days and meeting all other requirements                    Appointments  past 12m or future 3m with PCP    Date Provider   Last Visit   12/22/2021 Philippe Hahn MD   Next Visit   Visit date not found Philippe Hahn MD   ED visits in past 90 days: 0     Note composed:5:49 AM 02/28/2022

## 2022-04-25 ENCOUNTER — PATIENT MESSAGE (OUTPATIENT)
Dept: FAMILY MEDICINE | Facility: CLINIC | Age: 57
End: 2022-04-25
Payer: COMMERCIAL

## 2022-04-25 RX ORDER — LORAZEPAM 0.5 MG/1
TABLET ORAL
Qty: 20 TABLET | Refills: 0 | Status: SHIPPED | OUTPATIENT
Start: 2022-04-25 | End: 2022-04-26 | Stop reason: SDUPTHER

## 2022-04-25 NOTE — TELEPHONE ENCOUNTER
No new care gaps identified.  Powered by [employer]. Reference number: [id number].   4/25/2022 2:00:29 PM CDT

## 2022-04-26 ENCOUNTER — PATIENT MESSAGE (OUTPATIENT)
Dept: FAMILY MEDICINE | Facility: CLINIC | Age: 57
End: 2022-04-26
Payer: COMMERCIAL

## 2022-04-26 RX ORDER — LORAZEPAM 0.5 MG/1
TABLET ORAL
Qty: 20 TABLET | Refills: 0 | Status: SHIPPED | OUTPATIENT
Start: 2022-04-26 | End: 2022-06-27 | Stop reason: SDUPTHER

## 2022-04-26 NOTE — TELEPHONE ENCOUNTER
No new care gaps identified.  Powered by [employer]. Reference number: [id number].   4/26/2022 9:40:20 AM CDT

## 2022-05-04 ENCOUNTER — TELEPHONE (OUTPATIENT)
Dept: FAMILY MEDICINE | Facility: CLINIC | Age: 57
End: 2022-05-04
Payer: COMMERCIAL

## 2022-05-04 NOTE — TELEPHONE ENCOUNTER
----- Message from Bonnie Landis sent at 5/3/2022  4:02 PM CDT -----  Type:  Sooner Appointment Request    Caller is requesting a sooner appointment.  Caller declined first available appointment listed below.  Caller will not accept being placed on the waitlist and is requesting a message be sent to doctor.    Name of Caller: Patient   When is the first available appointment?  5/19  Symptoms:  hands hurting  Best Call Back Number:  [phone]  Additional Information:  Patient is requesting an appt. for 5/4-5/5 for sooner appt. due to hurting hands.

## 2022-05-07 ENCOUNTER — TELEPHONE (OUTPATIENT)
Dept: FAMILY MEDICINE | Facility: CLINIC | Age: 57
End: 2022-05-07

## 2022-05-07 NOTE — TELEPHONE ENCOUNTER
Tried to reach patient by phone got a recorder left a message on voicemail that I had called.  Was running behind in clinic and just missed her was 1 hour and 10 minute spine.  Just missed her as I was finishing up with the patient in front of her she was departing.  Left message to call me to see if there is anything I can do to help her.

## 2022-05-12 ENCOUNTER — OFFICE VISIT (OUTPATIENT)
Dept: FAMILY MEDICINE | Facility: CLINIC | Age: 57
End: 2022-05-12
Payer: COMMERCIAL

## 2022-05-12 ENCOUNTER — HOSPITAL ENCOUNTER (OUTPATIENT)
Dept: RADIOLOGY | Facility: HOSPITAL | Age: 57
Discharge: HOME | End: 2022-05-12
Attending: INTERNAL MEDICINE
Payer: COMMERCIAL

## 2022-05-12 VITALS
BODY MASS INDEX: 32.56 KG/M2 | WEIGHT: 190.69 LBS | HEIGHT: 64 IN | SYSTOLIC BLOOD PRESSURE: 126 MMHG | DIASTOLIC BLOOD PRESSURE: 70 MMHG

## 2022-05-12 DIAGNOSIS — M79.642 BILATERAL HAND PAIN: Primary | ICD-10-CM

## 2022-05-12 DIAGNOSIS — Z78.0 MENOPAUSE: ICD-10-CM

## 2022-05-12 DIAGNOSIS — F32.A DEPRESSION, UNSPECIFIED DEPRESSION TYPE: ICD-10-CM

## 2022-05-12 DIAGNOSIS — M79.641 BILATERAL HAND PAIN: Primary | ICD-10-CM

## 2022-05-12 DIAGNOSIS — M79.642 BILATERAL HAND PAIN: ICD-10-CM

## 2022-05-12 DIAGNOSIS — E55.9 VITAMIN D DEFICIENCY: ICD-10-CM

## 2022-05-12 DIAGNOSIS — Z00.00 WELLNESS EXAMINATION: ICD-10-CM

## 2022-05-12 DIAGNOSIS — Z11.59 NEED FOR HEPATITIS C SCREENING TEST: ICD-10-CM

## 2022-05-12 DIAGNOSIS — R53.83 FATIGUE, UNSPECIFIED TYPE: ICD-10-CM

## 2022-05-12 DIAGNOSIS — E66.9 OBESITY (BMI 30-39.9): ICD-10-CM

## 2022-05-12 DIAGNOSIS — M79.641 BILATERAL HAND PAIN: ICD-10-CM

## 2022-05-12 PROCEDURE — 99214 OFFICE O/P EST MOD 30 MIN: CPT | Mod: S$GLB,,, | Performed by: INTERNAL MEDICINE

## 2022-05-12 PROCEDURE — 99214 PR OFFICE/OUTPT VISIT, EST, LEVL IV, 30-39 MIN: ICD-10-PCS | Mod: S$GLB,,, | Performed by: INTERNAL MEDICINE

## 2022-05-12 PROCEDURE — 73120 X-RAY EXAM OF HAND: CPT | Mod: 26,50,, | Performed by: RADIOLOGY

## 2022-05-12 PROCEDURE — 99999 PR PBB SHADOW E&M-EST. PATIENT-LVL IV: CPT | Mod: PBBFAC,,, | Performed by: INTERNAL MEDICINE

## 2022-05-12 PROCEDURE — 99999 PR PBB SHADOW E&M-EST. PATIENT-LVL IV: ICD-10-PCS | Mod: PBBFAC,,, | Performed by: INTERNAL MEDICINE

## 2022-05-12 PROCEDURE — 73120 XR HAND PA 1 VIEW BILATERAL: ICD-10-PCS | Mod: 26,50,, | Performed by: RADIOLOGY

## 2022-05-12 PROCEDURE — 73120 X-RAY EXAM OF HAND: CPT | Mod: TC,50,PN

## 2022-05-12 RX ORDER — CYANOCOBALAMIN 1000 UG/ML
1000 INJECTION, SOLUTION INTRAMUSCULAR; SUBCUTANEOUS
Qty: 1 ML | Refills: 3 | Status: SHIPPED | OUTPATIENT
Start: 2022-05-12

## 2022-05-12 RX ORDER — BUTALBITAL, ACETAMINOPHEN AND CAFFEINE 50; 325; 40 MG/1; MG/1; MG/1
TABLET ORAL
Qty: 10 TABLET | Refills: 0 | Status: SHIPPED | OUTPATIENT
Start: 2022-05-12 | End: 2023-07-09 | Stop reason: SDUPTHER

## 2022-05-12 RX ORDER — CYCLOBENZAPRINE HCL 10 MG
10 TABLET ORAL NIGHTLY
Qty: 30 TABLET | Refills: 6 | Status: SHIPPED | OUTPATIENT
Start: 2022-05-12

## 2022-05-12 RX ORDER — EPINEPHRINE 0.3 MG/.3ML
1 INJECTION SUBCUTANEOUS ONCE
Qty: 0.6 ML | Refills: 0 | Status: SHIPPED | OUTPATIENT
Start: 2022-05-12 | End: 2023-04-03

## 2022-05-12 RX ORDER — BUPROPION HYDROCHLORIDE 150 MG/1
150 TABLET ORAL DAILY
Qty: 30 TABLET | Refills: 3 | Status: SHIPPED | OUTPATIENT
Start: 2022-05-12 | End: 2023-04-03

## 2022-05-12 RX ORDER — SYRINGE WITH NEEDLE, 1 ML 27GX1/2"
1 SYRINGE, EMPTY DISPOSABLE MISCELLANEOUS
Qty: 3 EACH | Refills: 3 | Status: SHIPPED | OUTPATIENT
Start: 2022-05-12

## 2022-05-12 NOTE — TELEPHONE ENCOUNTER
No new care gaps identified.  [facility] Embedded Care Gaps. Reference number: [id number]. 5/12/2022   10:24:54 AM DAVIDT

## 2022-05-13 ENCOUNTER — PATIENT MESSAGE (OUTPATIENT)
Dept: FAMILY MEDICINE | Facility: CLINIC | Age: 57
End: 2022-05-13
Payer: COMMERCIAL

## 2022-05-13 DIAGNOSIS — R00.2 HEART PALPITATIONS: Primary | ICD-10-CM

## 2022-05-16 ENCOUNTER — PATIENT MESSAGE (OUTPATIENT)
Dept: FAMILY MEDICINE | Facility: CLINIC | Age: 57
End: 2022-05-16
Payer: COMMERCIAL

## 2022-05-17 ENCOUNTER — PATIENT MESSAGE (OUTPATIENT)
Dept: FAMILY MEDICINE | Facility: CLINIC | Age: 57
End: 2022-05-17
Payer: COMMERCIAL

## 2022-05-18 ENCOUNTER — HOSPITAL ENCOUNTER (OUTPATIENT)
Dept: RADIOLOGY | Facility: HOSPITAL | Age: 57
Discharge: HOME | End: 2022-05-18
Attending: INTERNAL MEDICINE
Payer: COMMERCIAL

## 2022-05-18 DIAGNOSIS — R10.10 UPPER ABDOMINAL PAIN: ICD-10-CM

## 2022-05-18 PROCEDURE — 76700 US ABDOMEN COMPLETE: ICD-10-PCS | Mod: 26,,, | Performed by: RADIOLOGY

## 2022-05-18 PROCEDURE — 76700 US EXAM ABDOM COMPLETE: CPT | Mod: TC,PO

## 2022-05-18 PROCEDURE — 76700 US EXAM ABDOM COMPLETE: CPT | Mod: 26,,, | Performed by: RADIOLOGY

## 2022-05-20 LAB
25(OH)D3+25(OH)D2 SERPL-MCNC: 24.5 NG/ML (ref 30–100)
ALBUMIN SERPL-MCNC: 4.8 G/DL (ref 3.8–4.9)
ALBUMIN/GLOB SERPL: 2.4 {RATIO} (ref 1.2–2.2)
ALP SERPL-CCNC: 83 IU/L (ref 44–121)
ALT SERPL-CCNC: 19 IU/L (ref 0–32)
AST SERPL-CCNC: 16 IU/L (ref 0–40)
BASOPHILS # BLD AUTO: 0.1 X10E3/UL (ref 0–0.2)
BASOPHILS NFR BLD AUTO: 1 %
BILIRUB SERPL-MCNC: 0.4 MG/DL (ref 0–1.2)
BUN SERPL-MCNC: 11 MG/DL (ref 6–24)
BUN/CREAT SERPL: 11 (ref 9–23)
CALCIUM SERPL-MCNC: 10.2 MG/DL (ref 8.7–10.2)
CHLORIDE SERPL-SCNC: 102 MMOL/L (ref 96–106)
CHOLEST SERPL-MCNC: 204 MG/DL (ref 100–199)
CO2 SERPL-SCNC: 22 MMOL/L (ref 20–29)
CREAT SERPL-MCNC: 0.97 MG/DL (ref 0.57–1)
CRP SERPL-MCNC: 7 MG/L (ref 0–10)
EOSINOPHIL # BLD AUTO: 0.2 X10E3/UL (ref 0–0.4)
EOSINOPHIL NFR BLD AUTO: 2 %
ERYTHROCYTE [DISTWIDTH] IN BLOOD BY AUTOMATED COUNT: 13.5 % (ref 11.7–15.4)
EST. GFR  (NO RACE VARIABLE): 68 ML/MIN/1.73
ESTRADIOL SERPL-MCNC: <5 PG/ML
FSH SERPL-ACNC: 61.5 MIU/ML
GLOBULIN SER CALC-MCNC: 2 G/DL (ref 1.5–4.5)
GLUCOSE SERPL-MCNC: 83 MG/DL (ref 65–99)
HCT VFR BLD AUTO: 43.7 % (ref 34–46.6)
HCV AB S/CO SERPL IA: 0.2 S/CO RATIO (ref 0–0.9)
HDLC SERPL-MCNC: 52 MG/DL
HGB BLD-MCNC: 13.8 G/DL (ref 11.1–15.9)
IMM GRANULOCYTES # BLD AUTO: 0 X10E3/UL (ref 0–0.1)
IMM GRANULOCYTES NFR BLD AUTO: 1 %
LDLC SERPL CALC-MCNC: 127 MG/DL (ref 0–99)
LH SERPL-ACNC: 30.2 MIU/ML
LYMPHOCYTES # BLD AUTO: 2.6 X10E3/UL (ref 0.7–3.1)
LYMPHOCYTES NFR BLD AUTO: 33 %
MCH RBC QN AUTO: 27.3 PG (ref 26.6–33)
MCHC RBC AUTO-ENTMCNC: 31.6 G/DL (ref 31.5–35.7)
MCV RBC AUTO: 86 FL (ref 79–97)
MONOCYTES # BLD AUTO: 0.7 X10E3/UL (ref 0.1–0.9)
MONOCYTES NFR BLD AUTO: 8 %
NEUTROPHILS # BLD AUTO: 4.5 X10E3/UL (ref 1.4–7)
NEUTROPHILS NFR BLD AUTO: 55 %
PLATELET # BLD AUTO: 237 X10E3/UL (ref 150–450)
POTASSIUM SERPL-SCNC: 4.7 MMOL/L (ref 3.5–5.2)
PROGEST SERPL-MCNC: 0.4 NG/ML
PROT SERPL-MCNC: 6.8 G/DL (ref 6–8.5)
RBC # BLD AUTO: 5.06 X10E6/UL (ref 3.77–5.28)
RHEUMATOID FACT SERPL-ACNC: <10 IU/ML
SODIUM SERPL-SCNC: 139 MMOL/L (ref 134–144)
TESTOST SERPL-MCNC: <3 NG/DL (ref 4–50)
TRIGL SERPL-MCNC: 142 MG/DL (ref 0–149)
TSH SERPL DL<=0.005 MIU/L-ACNC: 1.92 UIU/ML (ref 0.45–4.5)
VIT B12 SERPL-MCNC: 592 PG/ML (ref 232–1245)
VLDLC SERPL CALC-MCNC: 25 MG/DL (ref 5–40)
WBC # BLD AUTO: 8.1 X10E3/UL (ref 3.4–10.8)

## 2022-06-15 ENCOUNTER — PATIENT MESSAGE (OUTPATIENT)
Dept: FAMILY MEDICINE | Facility: CLINIC | Age: 57
End: 2022-06-15
Payer: COMMERCIAL

## 2022-06-16 ENCOUNTER — OFFICE VISIT (OUTPATIENT)
Dept: CARDIOLOGY | Facility: CLINIC | Age: 57
End: 2022-06-16
Payer: COMMERCIAL

## 2022-06-16 VITALS
DIASTOLIC BLOOD PRESSURE: 72 MMHG | SYSTOLIC BLOOD PRESSURE: 118 MMHG | HEART RATE: 72 BPM | HEIGHT: 64 IN | BODY MASS INDEX: 32.74 KG/M2 | WEIGHT: 191.81 LBS

## 2022-06-16 DIAGNOSIS — R00.2 HEART PALPITATIONS: ICD-10-CM

## 2022-06-16 PROCEDURE — 99204 PR OFFICE/OUTPT VISIT, NEW, LEVL IV, 45-59 MIN: ICD-10-PCS | Mod: 25,S$GLB,, | Performed by: INTERNAL MEDICINE

## 2022-06-16 PROCEDURE — 99204 OFFICE O/P NEW MOD 45 MIN: CPT | Mod: 25,S$GLB,, | Performed by: INTERNAL MEDICINE

## 2022-06-16 PROCEDURE — 93010 ELECTROCARDIOGRAM REPORT: CPT | Mod: S$GLB,,, | Performed by: INTERNAL MEDICINE

## 2022-06-16 PROCEDURE — 99999 PR PBB SHADOW E&M-EST. PATIENT-LVL IV: CPT | Mod: PBBFAC,,, | Performed by: INTERNAL MEDICINE

## 2022-06-16 PROCEDURE — 93005 ELECTROCARDIOGRAM TRACING: CPT | Mod: PO

## 2022-06-16 PROCEDURE — 93010 EKG 12-LEAD: ICD-10-PCS | Mod: S$GLB,,, | Performed by: INTERNAL MEDICINE

## 2022-06-16 PROCEDURE — 99999 PR PBB SHADOW E&M-EST. PATIENT-LVL IV: ICD-10-PCS | Mod: PBBFAC,,, | Performed by: INTERNAL MEDICINE

## 2022-06-16 RX ORDER — CLONAZEPAM 0.5 MG/1
0.5 TABLET ORAL DAILY
COMMUNITY
End: 2022-06-30

## 2022-06-16 NOTE — PROGRESS NOTES
Subjective:    Patient ID:  Tori Quinn is a 56 y.o. female who presents for evaluation    of Palpitations      HPI 55 yo WF with anxiety and panic attacks states she gets palpitations sometimes several times a day and at night but only last seconds to a minute. Denies chest pain, SOB, or edema.  Resting EKG normal.     Review of Systems   Constitutional: Negative for decreased appetite, fever, malaise/fatigue, weight gain and weight loss.   HENT: Negative for hearing loss and nosebleeds.    Eyes: Negative for visual disturbance.   Cardiovascular: Positive for irregular heartbeat and palpitations. Negative for chest pain, claudication, cyanosis, dyspnea on exertion, leg swelling, near-syncope, orthopnea, paroxysmal nocturnal dyspnea and syncope.   Respiratory: Negative for cough, hemoptysis, shortness of breath, sleep disturbances due to breathing, snoring and wheezing.    Endocrine: Negative for cold intolerance, heat intolerance, polydipsia and polyuria.   Hematologic/Lymphatic: Negative for adenopathy and bleeding problem. Does not bruise/bleed easily.   Skin: Negative for color change, itching, poor wound healing, rash and suspicious lesions.   Musculoskeletal: Negative for arthritis, back pain, falls, joint pain, joint swelling, muscle cramps, muscle weakness and myalgias.   Gastrointestinal: Negative for bloating, abdominal pain, change in bowel habit, constipation, flatus, heartburn, hematemesis, hematochezia, hemorrhoids, jaundice, melena, nausea and vomiting.   Genitourinary: Negative for bladder incontinence, decreased libido, frequency, hematuria, hesitancy and urgency.   Neurological: Negative for brief paralysis, difficulty with concentration, excessive daytime sleepiness, dizziness, focal weakness, headaches, light-headedness, loss of balance, numbness, vertigo and weakness.   Psychiatric/Behavioral: Negative for altered mental status, depression and memory loss. The patient does not have  "insomnia and is not nervous/anxious.    Allergic/Immunologic: Negative for environmental allergies, hives and persistent infections.        Objective:    Physical Exam  Vitals and nursing note reviewed.   Constitutional:       Appearance: She is well-developed.      Comments: /72 (BP Location: Left arm, Patient Position: Sitting, BP Method: Large (Automatic))   Pulse 72   Ht 5' 4" (1.626 m)   Wt 87 kg (191 lb 12.8 oz)   BMI 32.92 kg/m²      HENT:      Head: Normocephalic and atraumatic.      Right Ear: External ear normal.      Left Ear: External ear normal.      Nose: Nose normal.   Eyes:      General: Lids are normal. No scleral icterus.        Right eye: No discharge.         Left eye: No discharge.      Conjunctiva/sclera: Conjunctivae normal.      Right eye: No hemorrhage.     Pupils: Pupils are equal, round, and reactive to light.   Neck:      Thyroid: No thyromegaly.      Vascular: No JVD.      Trachea: No tracheal deviation.   Cardiovascular:      Rate and Rhythm: Normal rate and regular rhythm.      Pulses: Intact distal pulses.      Heart sounds: Normal heart sounds. No murmur heard.    No friction rub. No gallop.   Pulmonary:      Effort: Pulmonary effort is normal. No respiratory distress.      Breath sounds: Normal breath sounds. No wheezing or rales.   Chest:      Chest wall: No tenderness.   Breasts: Breasts are symmetrical.       Abdominal:      General: Bowel sounds are normal. There is no distension.      Palpations: Abdomen is soft. There is no hepatomegaly or mass.      Tenderness: There is no abdominal tenderness. There is no guarding or rebound.   Musculoskeletal:         General: No tenderness. Normal range of motion.      Cervical back: Normal range of motion and neck supple.   Lymphadenopathy:      Cervical: No cervical adenopathy.   Skin:     General: Skin is warm and dry.      Coloration: Skin is not pale.      Findings: No erythema or rash.   Neurological:      Mental Status: " She is alert and oriented to person, place, and time.      Cranial Nerves: No cranial nerve deficit.      Coordination: Coordination normal.      Deep Tendon Reflexes: Reflexes normal.   Psychiatric:         Behavior: Behavior normal.         Thought Content: Thought content normal.         Judgment: Judgment normal.           Assessment:       1. Heart palpitations         Plan:     Patient advised to limit exposure to caffeine, stimulants, and alcohol      Orders Placed This Encounter   Procedures    Holter monitor - 48 hour    IN OFFICE EKG 12-LEAD (to [place])     No follow-ups on file.

## 2022-06-27 ENCOUNTER — PATIENT MESSAGE (OUTPATIENT)
Dept: FAMILY MEDICINE | Facility: CLINIC | Age: 57
End: 2022-06-27
Payer: COMMERCIAL

## 2022-06-27 NOTE — TELEPHONE ENCOUNTER
No new care gaps identified.  [facility] Embedded Care Gaps. Reference number: [id number]. 6/27/2022   2:27:28 PM CDT

## 2022-06-28 RX ORDER — LORAZEPAM 0.5 MG/1
TABLET ORAL
Qty: 20 TABLET | Refills: 0 | Status: SHIPPED | OUTPATIENT
Start: 2022-06-28 | End: 2022-06-28 | Stop reason: SDUPTHER

## 2022-06-28 RX ORDER — LORAZEPAM 0.5 MG/1
TABLET ORAL
Qty: 20 TABLET | Refills: 0 | Status: SHIPPED | OUTPATIENT
Start: 2022-06-28 | End: 2023-05-16 | Stop reason: SDUPTHER

## 2022-06-29 ENCOUNTER — TELEPHONE (OUTPATIENT)
Dept: FAMILY MEDICINE | Facility: CLINIC | Age: 57
End: 2022-06-29
Payer: COMMERCIAL

## 2022-06-29 ENCOUNTER — DOCUMENTATION ONLY (OUTPATIENT)
Dept: PHARMACY | Facility: CLINIC | Age: 57
End: 2022-06-29
Payer: COMMERCIAL

## 2022-06-29 DIAGNOSIS — Z12.31 OTHER SCREENING MAMMOGRAM: ICD-10-CM

## 2022-06-29 NOTE — TELEPHONE ENCOUNTER
She should not be taking both.  Looks like she got klonipin in May 30 pills form another provider. She should not be taking both

## 2022-06-29 NOTE — PROGRESS NOTES
PA RECEIVED FOR PATIENT BUT WAS NOT A PATIENT OF ANY OF OUR RHEUMATOLOGY PROVIDERS ON THE [facility].    FAXED BACK TO PHARMACY INFORMING THEM OF THE SITUATION    [facility]  MED ACCESS

## 2022-06-29 NOTE — TELEPHONE ENCOUNTER
----- Message from Nicole Linares sent at 6/29/2022  2:28 PM CDT -----  Contact: Arlen suarez/ [facility]  Type:  Pharmacy Calling to Clarify an RX    Name of Caller:  Arlen    Pharmacy Name:    [facility] [address] [facility] [address] [facility]  [address] [facility] [address]  Phone: [phone] Fax: [phone]    Prescription Name:  LORazepam (ATIVAN) 0.5 MG tablet    What do they need to clarify?:  Drug interaction w/ clonazePAM (KLONOPIN) 0.5 MG tablet from Dr. Dr. Mary Bartlett    Best Call Back Number:  [phone]    Additional Information:  Please call back.  Thanks.

## 2022-06-30 ENCOUNTER — PATIENT MESSAGE (OUTPATIENT)
Dept: FAMILY MEDICINE | Facility: CLINIC | Age: 57
End: 2022-06-30
Payer: COMMERCIAL

## 2022-06-30 NOTE — TELEPHONE ENCOUNTER
Spoke w/ Hemant at [facility], he was advised that pt is stopping Clonazepam and only taking Ativan . Med card updated .--     --lp

## 2022-07-05 ENCOUNTER — PATIENT MESSAGE (OUTPATIENT)
Dept: ADMINISTRATIVE | Facility: HOSPITAL | Age: 57
End: 2022-07-05
Payer: COMMERCIAL

## 2022-07-14 ENCOUNTER — CLINICAL SUPPORT (OUTPATIENT)
Dept: CARDIOLOGY | Facility: HOSPITAL | Age: 57
End: 2022-07-14
Attending: INTERNAL MEDICINE
Payer: COMMERCIAL

## 2022-07-14 DIAGNOSIS — R00.2 HEART PALPITATIONS: ICD-10-CM

## 2022-07-14 PROCEDURE — 93227 XTRNL ECG REC<48 HR R&I: CPT | Mod: ,,, | Performed by: INTERNAL MEDICINE

## 2022-07-14 PROCEDURE — 93225 XTRNL ECG REC<48 HRS REC: CPT | Mod: PO

## 2022-07-14 PROCEDURE — 93227 HOLTER MONITOR - 48 HOUR (CUPID ONLY): ICD-10-PCS | Mod: ,,, | Performed by: INTERNAL MEDICINE

## 2022-07-19 ENCOUNTER — TELEPHONE (OUTPATIENT)
Dept: CARDIOLOGY | Facility: CLINIC | Age: 57
End: 2022-07-19
Payer: COMMERCIAL

## 2022-07-19 LAB
OHS CV EVENT MONITOR DAY: 0
OHS CV HOLTER LENGTH DECIMAL HOURS: 48
OHS CV HOLTER LENGTH HOURS: 48
OHS CV HOLTER LENGTH MINUTES: 0
OHS CV HOLTER SINUS AVERAGE HR: 73
OHS CV HOLTER SINUS MAX HR: 122
OHS CV HOLTER SINUS MIN HR: 53

## 2022-07-28 ENCOUNTER — PATIENT MESSAGE (OUTPATIENT)
Dept: FAMILY MEDICINE | Facility: CLINIC | Age: 57
End: 2022-07-28
Payer: COMMERCIAL

## 2022-07-28 NOTE — TELEPHONE ENCOUNTER
No new care gaps identified.  [facility] Embedded Care Gaps. Reference number: [id number]. 7/28/2022   3:54:15 PM CDT

## 2022-07-29 RX ORDER — MECLIZINE HYDROCHLORIDE 25 MG/1
25 TABLET ORAL 3 TIMES DAILY PRN
Qty: 30 TABLET | Refills: 0 | Status: SHIPPED | OUTPATIENT
Start: 2022-07-29 | End: 2023-05-16 | Stop reason: SDUPTHER

## 2022-08-24 ENCOUNTER — PATIENT MESSAGE (OUTPATIENT)
Dept: ADMINISTRATIVE | Facility: HOSPITAL | Age: 57
End: 2022-08-24
Payer: COMMERCIAL

## 2022-10-10 ENCOUNTER — PATIENT MESSAGE (OUTPATIENT)
Dept: ADMINISTRATIVE | Facility: HOSPITAL | Age: 57
End: 2022-10-10
Payer: COMMERCIAL

## 2022-12-09 ENCOUNTER — PATIENT MESSAGE (OUTPATIENT)
Dept: FAMILY MEDICINE | Facility: CLINIC | Age: 57
End: 2022-12-09
Payer: COMMERCIAL

## 2022-12-09 RX ORDER — SPIRONOLACTONE 100 MG/1
200 TABLET, FILM COATED ORAL DAILY
Qty: 90 TABLET | Refills: 1 | Status: SHIPPED | OUTPATIENT
Start: 2022-12-09 | End: 2023-07-30 | Stop reason: SDUPTHER

## 2022-12-09 NOTE — TELEPHONE ENCOUNTER
No new care gaps identified.  [facility] Embedded Care Gaps. Reference number: [id number]. 12/09/2022   3:40:21 PM CST

## 2022-12-21 ENCOUNTER — PATIENT MESSAGE (OUTPATIENT)
Dept: FAMILY MEDICINE | Facility: CLINIC | Age: 57
End: 2022-12-21
Payer: COMMERCIAL

## 2023-01-17 ENCOUNTER — PATIENT MESSAGE (OUTPATIENT)
Dept: ADMINISTRATIVE | Facility: HOSPITAL | Age: 58
End: 2023-01-17
Payer: COMMERCIAL

## 2023-03-17 DIAGNOSIS — F32.A DEPRESSION, UNSPECIFIED DEPRESSION TYPE: Primary | ICD-10-CM

## 2023-03-17 RX ORDER — FLUOXETINE 10 MG/1
CAPSULE ORAL
Qty: 270 CAPSULE | Refills: 0 | Status: SHIPPED | OUTPATIENT
Start: 2023-03-17 | End: 2023-03-19 | Stop reason: SDUPTHER

## 2023-03-17 NOTE — TELEPHONE ENCOUNTER
Refill Decision Note   Tori Quinn  is requesting a refill authorization.  Brief Assessment and Rationale for Refill:  Approve     Medication Therapy Plan:       Medication Reconciliation Completed: No   Comments:     Provider Staff:     Action is required for this patient.   Please see care gap opportunities below in Care Due Message.     Thanks!  [facility]     Appointments      Date Provider   Last Visit   5/12/2022 Philippe Hahn MD   Next Visit   Visit date not found Philippe Hahn MD     Note composed:6:02 PM 03/17/2023           Note composed:6:02 PM 03/17/2023

## 2023-03-17 NOTE — TELEPHONE ENCOUNTER
Care Due:                  Date            Visit Type   Department     Provider  --------------------------------------------------------------------------------                                MYCHART                              FOLLOWUP/OF  [facility]  Last Visit: 05-      FICE VISIT   [facility]       Philippe Hahn  Next Visit: None Scheduled  None         None Found                                                            Last  Test          Frequency    Reason                     Performed    Due Date  --------------------------------------------------------------------------------    Office Visit  12 months..  FLUoxetine, azelastine,    05- 05-                             buPROPion, spironolactone    CMP.........  12 months..  spironolactone...........  05-   05-    [facility] Embedded Care Gaps. Reference number: [id number]. 3/17/2023   4:21:54 PM CDT

## 2023-03-19 RX ORDER — FLUOXETINE 10 MG/1
30 CAPSULE ORAL DAILY
Qty: 90 CAPSULE | Refills: 1 | Status: SHIPPED | OUTPATIENT
Start: 2023-03-19 | End: 2023-04-03

## 2023-03-29 ENCOUNTER — PATIENT MESSAGE (OUTPATIENT)
Dept: FAMILY MEDICINE | Facility: CLINIC | Age: 58
End: 2023-03-29
Payer: COMMERCIAL

## 2023-03-29 DIAGNOSIS — Z13.220 SCREENING FOR HYPERLIPIDEMIA: ICD-10-CM

## 2023-03-29 DIAGNOSIS — Z13.29 SCREENING FOR THYROID DISORDER: ICD-10-CM

## 2023-03-29 DIAGNOSIS — Z00.00 WELLNESS EXAMINATION: Primary | ICD-10-CM

## 2023-03-29 DIAGNOSIS — E55.9 VITAMIN D DEFICIENCY: ICD-10-CM

## 2023-03-31 LAB
25(OH)D3+25(OH)D2 SERPL-MCNC: 30 NG/ML (ref 30–100)
ALBUMIN SERPL-MCNC: 4.5 G/DL (ref 3.8–4.9)
ALBUMIN/GLOB SERPL: 2.1 {RATIO} (ref 1.2–2.2)
ALP SERPL-CCNC: 62 IU/L (ref 44–121)
ALT SERPL-CCNC: 17 IU/L (ref 0–32)
AST SERPL-CCNC: 17 IU/L (ref 0–40)
BASOPHILS # BLD AUTO: 0.1 X10E3/UL (ref 0–0.2)
BASOPHILS NFR BLD AUTO: 1 %
BILIRUB SERPL-MCNC: 0.4 MG/DL (ref 0–1.2)
BUN SERPL-MCNC: 8 MG/DL (ref 6–24)
BUN/CREAT SERPL: 8 (ref 9–23)
CALCIUM SERPL-MCNC: 9.4 MG/DL (ref 8.7–10.2)
CHLORIDE SERPL-SCNC: 107 MMOL/L (ref 96–106)
CHOLEST SERPL-MCNC: 209 MG/DL (ref 100–199)
CO2 SERPL-SCNC: 23 MMOL/L (ref 20–29)
CREAT SERPL-MCNC: 0.97 MG/DL (ref 0.57–1)
EOSINOPHIL # BLD AUTO: 0.2 X10E3/UL (ref 0–0.4)
EOSINOPHIL NFR BLD AUTO: 2 %
ERYTHROCYTE [DISTWIDTH] IN BLOOD BY AUTOMATED COUNT: 13 % (ref 11.7–15.4)
EST. GFR  (NO RACE VARIABLE): 67 ML/MIN/1.73
GLOBULIN SER CALC-MCNC: 2.1 G/DL (ref 1.5–4.5)
GLUCOSE SERPL-MCNC: 87 MG/DL (ref 70–99)
HCT VFR BLD AUTO: 43.2 % (ref 34–46.6)
HDLC SERPL-MCNC: 43 MG/DL
HGB BLD-MCNC: 14.1 G/DL (ref 11.1–15.9)
IMM GRANULOCYTES # BLD AUTO: 0 X10E3/UL (ref 0–0.1)
IMM GRANULOCYTES NFR BLD AUTO: 0 %
LDLC SERPL CALC-MCNC: 135 MG/DL (ref 0–99)
LYMPHOCYTES # BLD AUTO: 2.4 X10E3/UL (ref 0.7–3.1)
LYMPHOCYTES NFR BLD AUTO: 27 %
MCH RBC QN AUTO: 29.3 PG (ref 26.6–33)
MCHC RBC AUTO-ENTMCNC: 32.6 G/DL (ref 31.5–35.7)
MCV RBC AUTO: 90 FL (ref 79–97)
MONOCYTES # BLD AUTO: 0.7 X10E3/UL (ref 0.1–0.9)
MONOCYTES NFR BLD AUTO: 8 %
NEUTROPHILS # BLD AUTO: 5.5 X10E3/UL (ref 1.4–7)
NEUTROPHILS NFR BLD AUTO: 62 %
PLATELET # BLD AUTO: 253 X10E3/UL (ref 150–450)
POTASSIUM SERPL-SCNC: 4.7 MMOL/L (ref 3.5–5.2)
PROT SERPL-MCNC: 6.6 G/DL (ref 6–8.5)
RBC # BLD AUTO: 4.82 X10E6/UL (ref 3.77–5.28)
SODIUM SERPL-SCNC: 142 MMOL/L (ref 134–144)
TRIGL SERPL-MCNC: 171 MG/DL (ref 0–149)
TSH SERPL DL<=0.005 MIU/L-ACNC: 2.05 UIU/ML (ref 0.45–4.5)
VLDLC SERPL CALC-MCNC: 31 MG/DL (ref 5–40)
WBC # BLD AUTO: 8.8 X10E3/UL (ref 3.4–10.8)

## 2023-04-03 ENCOUNTER — OFFICE VISIT (OUTPATIENT)
Dept: FAMILY MEDICINE | Facility: CLINIC | Age: 58
End: 2023-04-03
Payer: COMMERCIAL

## 2023-04-03 VITALS
HEART RATE: 75 BPM | DIASTOLIC BLOOD PRESSURE: 76 MMHG | HEIGHT: 64 IN | RESPIRATION RATE: 16 BRPM | OXYGEN SATURATION: 98 % | WEIGHT: 185.63 LBS | BODY MASS INDEX: 31.69 KG/M2 | SYSTOLIC BLOOD PRESSURE: 112 MMHG

## 2023-04-03 DIAGNOSIS — R53.83 FATIGUE, UNSPECIFIED TYPE: ICD-10-CM

## 2023-04-03 DIAGNOSIS — M26.622 ARTHRALGIA OF LEFT TEMPOROMANDIBULAR JOINT: ICD-10-CM

## 2023-04-03 DIAGNOSIS — E78.2 MIXED HYPERLIPIDEMIA: ICD-10-CM

## 2023-04-03 DIAGNOSIS — F32.A DEPRESSION, UNSPECIFIED DEPRESSION TYPE: ICD-10-CM

## 2023-04-03 DIAGNOSIS — M25.50 ARTHRALGIA OF MULTIPLE JOINTS: Primary | ICD-10-CM

## 2023-04-03 PROCEDURE — 99214 OFFICE O/P EST MOD 30 MIN: CPT | Mod: S$GLB,,, | Performed by: INTERNAL MEDICINE

## 2023-04-03 PROCEDURE — 99214 PR OFFICE/OUTPT VISIT, EST, LEVL IV, 30-39 MIN: ICD-10-PCS | Mod: S$GLB,,, | Performed by: INTERNAL MEDICINE

## 2023-04-03 PROCEDURE — 99999 PR PBB SHADOW E&M-EST. PATIENT-LVL IV: CPT | Mod: PBBFAC,,, | Performed by: INTERNAL MEDICINE

## 2023-04-03 PROCEDURE — 99999 PR PBB SHADOW E&M-EST. PATIENT-LVL IV: ICD-10-PCS | Mod: PBBFAC,,, | Performed by: INTERNAL MEDICINE

## 2023-04-03 RX ORDER — DULOXETIN HYDROCHLORIDE 30 MG/1
30 CAPSULE, DELAYED RELEASE ORAL DAILY
Qty: 30 CAPSULE | Refills: 1 | Status: SHIPPED | OUTPATIENT
Start: 2023-04-03 | End: 2023-05-04 | Stop reason: SDUPTHER

## 2023-04-03 RX ORDER — BUPROPION HYDROCHLORIDE 150 MG/1
150 TABLET ORAL DAILY
Qty: 30 TABLET | Refills: 3 | Status: SHIPPED | OUTPATIENT
Start: 2023-04-03 | End: 2023-05-04 | Stop reason: SDUPTHER

## 2023-04-03 RX ORDER — BUPROPION HYDROCHLORIDE 300 MG/1
300 TABLET ORAL DAILY
COMMUNITY
End: 2023-04-03

## 2023-04-03 RX ORDER — PROGESTERONE 200 MG/1
200 CAPSULE ORAL DAILY
COMMUNITY
End: 2024-01-18

## 2023-04-03 NOTE — PROGRESS NOTES
Subjective:       Patient ID: Tori Quinn is a 57 y.o. female.    Chief Complaint: Pain (Patient complains of hips,neck,shoulder and jaw with numbness ), Anxiety, and Depression   Pain  Associated symptoms include arthralgias and fatigue. Pertinent negatives include no chest pain or joint swelling.   Anxiety  Patient reports no chest pain or confusion.       DepressionPatient is not experiencing: choking sensation and confusion.        Gyn: Dr Long  - HRT   MM2021 overdue   Dexa:  2021 normal    Colonoscopy:  yes 3/2019 Dr Bermeo  10 yr   Immunizations: Flu: no  Tdap:   Shingles: no  Covid: yes   Smoker: no   Get old note          Having more aches and pains fatigue body aches.  Stressed with teenage daughter recently formally diagnosed with high functioning autism.      Recently started Wellbutrin 300 also now noticing left lower jaw tingling pain TMJ area.  Does have history of this not sure if grinding or clenching her teeth more.  Recommend she reduce the Wellbutrin to 150 and monitor.  Depression: stressed.   Rx Wellbutrin 300 recently started, Prozac 30 long-time.  Insurance does not cover anxiety medications.  ///   father in law moving in //daughter is 15-currently home schooling, severe sensory processing disorder, high functioning autism difficulty with textures foods, she does not get along with her father.       Arthralgias: ?  Fibromyalgia Multiple joints.  Hands bilateral. Left-hand dominant.   thoracic scoliosis.     Heart palpations:  Stable    Vertigo/BPPV: just had MRI last wk- going through testing. Mgmt neuro Dr Burks.  + tinnitus both ears    Headaches/migraines/tension:  Controlled Rx Fiorect , p.r.n. Flexeril    Insect bite reaction:  Possible spiders.  Rx EpiPen.           ____________________________________________________________________________________________________  Assessment & Plan:  1. Arthralgia of multiple joints    2. Mixed hyperlipidemia    3.  Arthralgia of left temporomandibular joint    4. Fatigue, unspecified type    5. Depression, unspecified depression type  - DULoxetine (CYMBALTA) 30 MG capsule; Take 1 capsule (30 mg total) by mouth once daily.  Dispense: 30 capsule; Refill: 1  - buPROPion (WELLBUTRIN XL) 150 MG TB24 tablet; Take 1 tablet (150 mg total) by mouth once daily.  Dispense: 30 tablet; Refill: 3     Arthralgia of multiple joints    Mixed hyperlipidemia  Comments:  Recommend diet changes limiting saturated fat    Arthralgia of left temporomandibular joint    Fatigue, unspecified type    Depression, unspecified depression type  -     DULoxetine (CYMBALTA) 30 MG capsule; Take 1 capsule (30 mg total) by mouth once daily.  Dispense: 30 capsule; Refill: 1  -     buPROPion (WELLBUTRIN XL) 150 MG TB24 tablet; Take 1 tablet (150 mg total) by mouth once daily.  Dispense: 30 tablet; Refill: 3        Continue to work on regular exercise, maintain healthy weight, balanced diet. Avoid unhealthy habits: smoking, excessive alcohol intake.     Disclaimer: This note was partly generated using dictation software which may occasionally result in transcription errors  ____________________________________________________________________________________________________  Review of Systems:  Review of Systems   Constitutional:  Positive for fatigue. Negative for appetite change.   HENT:  Negative for nosebleeds.    Eyes:  Negative for pain.   Respiratory:  Negative for choking.    Cardiovascular:  Negative for chest pain.   Gastrointestinal:  Negative for blood in stool.   Genitourinary:  Negative for hematuria.   Musculoskeletal:  Positive for arthralgias. Negative for joint swelling.   Skin:  Negative for pallor.   Neurological:  Negative for facial asymmetry.   Hematological:  Does not bruise/bleed easily.   Psychiatric/Behavioral:  Positive for depression and dysphoric mood. Negative for confusion.      Objective:     Wt Readings from Last 3 Encounters:    04/03/23 84.2 kg (185 lb 10 oz)   06/16/22 87 kg (191 lb 12.8 oz)   05/12/22 86.5 kg (190 lb 11.2 oz)     BP Readings from Last 3 Encounters:   04/03/23 112/76   06/16/22 118/72   05/12/22 126/70       Lab Results   Component Value Date    WBC 8.8 03/30/2023    HGB 14.1 03/30/2023    HCT 43.2 03/30/2023     03/30/2023     03/30/2023    K 4.7 03/30/2023     (H) 03/30/2023    ALT 17 03/30/2023    AST 17 03/30/2023    CO2 23 03/30/2023    CREATININE 0.97 03/30/2023    BUN 8 03/30/2023    GLU 87 03/30/2023      No results found for: HGBA1C   Lab Results   Component Value Date    TSH 2.050 03/30/2023    TSH 1.920 05/19/2022     No results found for: FREET4  Lab Results   Component Value Date    LDLCALC 135 (H) 03/30/2023    LDLCALC 127 (H) 05/19/2022    LDLCALC 123 08/13/2020     Lab Results   Component Value Date    TRIG 171 (H) 03/30/2023    TRIG 142 05/19/2022    TRIG 184 (A) 08/13/2020            Physical Exam  Constitutional:       Appearance: Normal appearance.   HENT:      Head: Normocephalic and atraumatic.   Eyes:      Extraocular Movements: Extraocular movements intact.      Pupils: Pupils are equal, round, and reactive to light.   Cardiovascular:      Rate and Rhythm: Normal rate.   Pulmonary:      Effort: Pulmonary effort is normal.   Neurological:      Mental Status: She is alert and oriented to person, place, and time.   Psychiatric:         Mood and Affect: Mood normal.       Medication List with Changes/Refills   New Medications    DULOXETINE (CYMBALTA) 30 MG CAPSULE    Take 1 capsule (30 mg total) by mouth once daily.   Current Medications    AZELASTINE (ASTELIN) 137 MCG (0.1 %) NASAL SPRAY    1 spray (137 mcg total) by Nasal route 2 (two) times daily.    BUTALBITAL-ACETAMINOPHEN-CAFFEINE -40 MG (FIORICET, ESGIC) -40 MG PER TABLET    One p.o. x1 headache.  Repeat 2 hr as needed    CYANOCOBALAMIN 1,000 MCG/ML INJECTION    Inject 1 mL (1,000 mcg total) into the muscle every  "28 days.    CYCLOBENZAPRINE (FLEXERIL) 10 MG TABLET    Take 1 tablet (10 mg total) by mouth every evening.    EPINEPHRINE (EPIPEN) 0.3 MG/0.3 ML ATIN    Inject 0.3 mLs (0.3 mg total) into the muscle once. Dispense 1 kit for 1 dose    LORAZEPAM (ATIVAN) 0.5 MG TABLET    TAKE 1 TABLET BY MOUTH ONCE DAILY AS NEEDED UP TO 5 TIMES WEEKLY (SEDATION PRECAUTIONS)    MECLIZINE (ANTIVERT) 25 MG TABLET    Take 1 tablet (25 mg total) by mouth 3 (three) times daily as needed for Dizziness.    PROGESTERONE (PROMETRIUM) 200 MG CAPSULE    Take 200 mg by mouth once daily.    SPIRONOLACTONE (ALDACTONE) 100 MG TABLET    Take 2 tablets (200 mg total) by mouth once daily.    SYRINGE WITH NEEDLE 1 ML 25 GAUGE X 1" SYRG    Inject 1 Syringe as directed every 30 days. Disp 1 box    TOPIRAMATE (TOPAMAX) 25 MG TABLET    Take 25 mg by mouth 2 (two) times daily.    UNABLE TO FIND    medication name: C* E2-Testosterone 4 pumps -20mg   Changed and/or Refilled Medications    Modified Medication Previous Medication    BUPROPION (WELLBUTRIN XL) 150 MG TB24 TABLET buPROPion (WELLBUTRIN XL) 150 MG TB24 tablet       Take 1 tablet (150 mg total) by mouth once daily.    Take 1 tablet (150 mg total) by mouth once daily.   Discontinued Medications    BUPROPION (WELLBUTRIN XL) 300 MG 24 HR TABLET    Take 300 mg by mouth once daily.    FLUOXETINE 10 MG CAPSULE    Take 3 capsules (30 mg total) by mouth once daily. ((Due labs & visit)) call office       "

## 2023-05-04 ENCOUNTER — PATIENT MESSAGE (OUTPATIENT)
Dept: FAMILY MEDICINE | Facility: CLINIC | Age: 58
End: 2023-05-04
Payer: COMMERCIAL

## 2023-05-04 ENCOUNTER — TELEPHONE (OUTPATIENT)
Dept: FAMILY MEDICINE | Facility: CLINIC | Age: 58
End: 2023-05-04
Payer: COMMERCIAL

## 2023-05-04 DIAGNOSIS — F32.A DEPRESSION, UNSPECIFIED DEPRESSION TYPE: ICD-10-CM

## 2023-05-04 RX ORDER — DULOXETIN HYDROCHLORIDE 30 MG/1
30 CAPSULE, DELAYED RELEASE ORAL DAILY
Qty: 90 CAPSULE | Refills: 2 | Status: SHIPPED | OUTPATIENT
Start: 2023-05-04 | End: 2023-05-04 | Stop reason: SDUPTHER

## 2023-05-04 RX ORDER — DULOXETIN HYDROCHLORIDE 30 MG/1
30 CAPSULE, DELAYED RELEASE ORAL DAILY
Qty: 30 CAPSULE | Refills: 6 | Status: SHIPPED | OUTPATIENT
Start: 2023-05-04 | End: 2023-05-04 | Stop reason: SDUPTHER

## 2023-05-04 RX ORDER — BUPROPION HYDROCHLORIDE 150 MG/1
150 TABLET ORAL DAILY
Qty: 30 TABLET | Refills: 6 | Status: SHIPPED | OUTPATIENT
Start: 2023-05-04 | End: 2023-05-04 | Stop reason: SDUPTHER

## 2023-05-04 RX ORDER — MINOXIDIL 2.5 MG/1
TABLET ORAL
COMMUNITY
Start: 2023-04-10

## 2023-05-04 RX ORDER — BUPROPION HYDROCHLORIDE 150 MG/1
150 TABLET ORAL DAILY
Qty: 90 TABLET | Refills: 2 | Status: SHIPPED | OUTPATIENT
Start: 2023-05-04 | End: 2023-05-04 | Stop reason: SDUPTHER

## 2023-05-04 NOTE — TELEPHONE ENCOUNTER
No care due was identified.  [facility] Embedded Care Due Messages. Reference number: [id number].   5/04/2023 10:13:52 AM CDT

## 2023-05-05 RX ORDER — BUPROPION HYDROCHLORIDE 150 MG/1
150 TABLET ORAL DAILY
Qty: 90 TABLET | Refills: 2 | Status: SHIPPED | OUTPATIENT
Start: 2023-05-05 | End: 2023-09-02 | Stop reason: SDUPTHER

## 2023-05-05 RX ORDER — DULOXETIN HYDROCHLORIDE 30 MG/1
30 CAPSULE, DELAYED RELEASE ORAL DAILY
Qty: 90 CAPSULE | Refills: 2 | Status: SHIPPED | OUTPATIENT
Start: 2023-05-05 | End: 2023-09-02 | Stop reason: SDUPTHER

## 2023-05-05 NOTE — TELEPHONE ENCOUNTER
No care due was identified.  [facility] Embedded Care Due Messages. Reference number: [id number].   5/04/2023 7:14:44 PM CDT

## 2023-05-15 ENCOUNTER — PATIENT MESSAGE (OUTPATIENT)
Dept: FAMILY MEDICINE | Facility: CLINIC | Age: 58
End: 2023-05-15
Payer: COMMERCIAL

## 2023-05-15 DIAGNOSIS — F41.9 ANXIETY: Primary | ICD-10-CM

## 2023-05-16 RX ORDER — LORAZEPAM 0.5 MG/1
TABLET ORAL
Qty: 20 TABLET | Refills: 0 | Status: SHIPPED | OUTPATIENT
Start: 2023-05-16 | End: 2023-12-18 | Stop reason: SDUPTHER

## 2023-05-16 RX ORDER — MECLIZINE HYDROCHLORIDE 25 MG/1
25 TABLET ORAL 3 TIMES DAILY PRN
Qty: 30 TABLET | Refills: 0 | Status: SHIPPED | OUTPATIENT
Start: 2023-05-16

## 2023-05-16 NOTE — TELEPHONE ENCOUNTER
No care due was identified.  [facility] Embedded Care Due Messages. Reference number: [id number].   5/16/2023 7:18:14 AM CDT

## 2023-07-09 NOTE — TELEPHONE ENCOUNTER
No care due was identified.  [facility] Embedded Care Due Messages. Reference number: [id number].   7/09/2023 3:58:53 PM CDT

## 2023-07-10 RX ORDER — BUTALBITAL, ACETAMINOPHEN AND CAFFEINE 50; 325; 40 MG/1; MG/1; MG/1
TABLET ORAL
Qty: 10 TABLET | Refills: 0 | Status: SHIPPED | OUTPATIENT
Start: 2023-07-10 | End: 2023-12-18 | Stop reason: SDUPTHER

## 2023-07-30 NOTE — TELEPHONE ENCOUNTER
No care due was identified.  [facility] Embedded Care Due Messages. Reference number: [id number].   7/30/2023 10:55:31 AM CDT

## 2023-07-31 RX ORDER — SPIRONOLACTONE 100 MG/1
200 TABLET, FILM COATED ORAL DAILY
Qty: 90 TABLET | Refills: 1 | Status: SHIPPED | OUTPATIENT
Start: 2023-07-31 | End: 2023-09-02 | Stop reason: SDUPTHER

## 2023-08-09 ENCOUNTER — PATIENT MESSAGE (OUTPATIENT)
Dept: FAMILY MEDICINE | Facility: CLINIC | Age: 58
End: 2023-08-09
Payer: COMMERCIAL

## 2023-09-02 ENCOUNTER — PATIENT MESSAGE (OUTPATIENT)
Dept: FAMILY MEDICINE | Facility: CLINIC | Age: 58
End: 2023-09-02
Payer: COMMERCIAL

## 2023-09-02 DIAGNOSIS — F32.A DEPRESSION, UNSPECIFIED DEPRESSION TYPE: ICD-10-CM

## 2023-09-02 NOTE — TELEPHONE ENCOUNTER
No care due was identified.  [facility] Embedded Care Due Messages. Reference number: [id number].   9/02/2023 11:41:18 AM CDT

## 2023-09-04 RX ORDER — BUPROPION HYDROCHLORIDE 150 MG/1
150 TABLET ORAL DAILY
Qty: 90 TABLET | Refills: 2 | Status: SHIPPED | OUTPATIENT
Start: 2023-09-04 | End: 2024-09-03

## 2023-09-04 RX ORDER — DULOXETIN HYDROCHLORIDE 30 MG/1
30 CAPSULE, DELAYED RELEASE ORAL DAILY
Qty: 90 CAPSULE | Refills: 2 | Status: SHIPPED | OUTPATIENT
Start: 2023-09-04 | End: 2024-09-03

## 2023-09-04 RX ORDER — SPIRONOLACTONE 100 MG/1
200 TABLET, FILM COATED ORAL DAILY
Qty: 180 TABLET | Refills: 1 | Status: SHIPPED | OUTPATIENT
Start: 2023-09-04 | End: 2024-01-24

## 2023-10-11 ENCOUNTER — PATIENT MESSAGE (OUTPATIENT)
Dept: FAMILY MEDICINE | Facility: CLINIC | Age: 58
End: 2023-10-11
Payer: COMMERCIAL

## 2023-10-11 DIAGNOSIS — Z78.0 MENOPAUSE: Primary | ICD-10-CM

## 2023-10-23 ENCOUNTER — PATIENT MESSAGE (OUTPATIENT)
Dept: FAMILY MEDICINE | Facility: CLINIC | Age: 58
End: 2023-10-23
Payer: COMMERCIAL

## 2023-10-24 ENCOUNTER — OFFICE VISIT (OUTPATIENT)
Dept: FAMILY MEDICINE | Facility: CLINIC | Age: 58
End: 2023-10-24
Payer: COMMERCIAL

## 2023-10-24 VITALS
HEIGHT: 64 IN | WEIGHT: 181.88 LBS | BODY MASS INDEX: 31.05 KG/M2 | DIASTOLIC BLOOD PRESSURE: 78 MMHG | SYSTOLIC BLOOD PRESSURE: 116 MMHG | HEART RATE: 86 BPM | OXYGEN SATURATION: 99 %

## 2023-10-24 DIAGNOSIS — B96.89 ACUTE BACTERIAL SINUSITIS: Primary | ICD-10-CM

## 2023-10-24 DIAGNOSIS — Z12.31 BREAST CANCER SCREENING BY MAMMOGRAM: ICD-10-CM

## 2023-10-24 DIAGNOSIS — J01.90 ACUTE BACTERIAL SINUSITIS: Primary | ICD-10-CM

## 2023-10-24 PROCEDURE — 99214 OFFICE O/P EST MOD 30 MIN: CPT | Mod: S$GLB,,,

## 2023-10-24 PROCEDURE — 99999 PR PBB SHADOW E&M-EST. PATIENT-LVL IV: CPT | Mod: PBBFAC,,,

## 2023-10-24 PROCEDURE — 99999 PR PBB SHADOW E&M-EST. PATIENT-LVL IV: ICD-10-PCS | Mod: PBBFAC,,,

## 2023-10-24 PROCEDURE — 99214 PR OFFICE/OUTPT VISIT, EST, LEVL IV, 30-39 MIN: ICD-10-PCS | Mod: S$GLB,,,

## 2023-10-24 RX ORDER — AMOXICILLIN AND CLAVULANATE POTASSIUM 875; 125 MG/1; MG/1
1 TABLET, FILM COATED ORAL EVERY 12 HOURS
Qty: 14 TABLET | Refills: 0 | Status: SHIPPED | OUTPATIENT
Start: 2023-10-24 | End: 2023-10-31

## 2023-10-24 NOTE — PROGRESS NOTES
"[facility]  [address]  [place] [address]    Subjective    Chief Complaint:   Chief Complaint   Patient presents with    Sinus Problem     Sinus,        History of Present Illness:     Tori Quinn is a(n) 57 y.o. female with past medical history as noted below who presents to the clinic today for sinus pain.    Reports several day onset frontal sinus headache and swollen lymph nodes in neck. Also reports nasal congestion. Denies fever, sore throat, cough, chest pain, or shortness of breath. She is prone to sinus infections and this feels similar.     Home test for covid negative.        Problem List:   Patient Active Problem List   Diagnosis    Depression    Anxiety    Mixed hyperlipidemia    Arthralgia of multiple joints       Current Outpatient Medications:   Current Outpatient Medications   Medication Instructions    azelastine (ASTELIN) 137 mcg, Nasal, 2 times daily    buPROPion (WELLBUTRIN XL) 150 mg, Oral, Daily    butalbital-acetaminophen-caffeine -40 mg (FIORICET, ESGIC) -40 mg per tablet One p.o. x1 headache.  Repeat 2 hr as needed    cyanocobalamin 1,000 mcg, Intramuscular, Every 28 days    cyclobenzaprine (FLEXERIL) 10 mg, Oral, Nightly    DULoxetine (CYMBALTA) 30 mg, Oral, Daily    EPINEPHrine (EPIPEN) 0.3 mg, Intramuscular, Once, Dispense 1 kit    LORazepam (ATIVAN) 0.5 MG tablet TAKE 1 TABLET BY MOUTH ONCE DAILY AS NEEDED UP TO 5 TIMES WEEKLY (SEDATION PRECAUTIONS)    meclizine (ANTIVERT) 25 mg, Oral, 3 times daily PRN    minoxidiL (LONITEN) 2.5 MG tablet SMARTSI.25 Tablet(s) By Mouth Daily    progesterone (PROMETRIUM) 200 mg, Oral, Daily    spironolactone (ALDACTONE) 200 mg, Oral, Daily    syringe with needle 1 mL 25 gauge x 1" Syrg 1 Syringe, Injection, Every 30 days, Disp 1 box    topiramate (TOPAMAX) 25 mg, Oral, 2 times daily    UNABLE TO FIND medication name: C* E2-Testosterone 4 pumps -20mg       Surgical History: "   Past Surgical History:   Procedure Laterality Date    COLONOSCOPY W/ POLYPECTOMY  03/26/2019    HERNIA REPAIR      TONSILLECTOMY      TUBAL LIGATION         Family History:   Family History   Problem Relation Age of Onset    Arthritis Mother         poss rheum    Uterine cancer Mother 77        xrt, chemo     Depression Father         anxiety, ocd     Cancer Sister         Uterin cancer    Cancer Sister         female cancer    Cancer Maternal Grandmother         age 40 - female cancer     Learning disabilities Daughter         Severe sensory processing disorder    Anxiety disorder Daughter        Allergies: Review of patient's allergies indicates:  No Known Allergies    Tobacco Status:   Tobacco Use: Low Risk  (10/24/2023)    Patient History     Smoking Tobacco Use: Never     Smokeless Tobacco Use: Never     Passive Exposure: Not on file       Sexual Activity:   Social History     Substance and Sexual Activity   Sexual Activity Yes    Partners: Male    Birth control/protection: Post-menopausal       Alcohol Use:   Social History     Substance and Sexual Activity   Alcohol Use Yes    Alcohol/week: 2.0 standard drinks of alcohol    Types: 1 Glasses of wine, 1 Cans of beer per week         Objective       There were no vitals filed for this visit.    Review of Systems   Constitutional:  Negative for chills and fever.   HENT:  Positive for congestion and sinus pain. Negative for ear pain, hearing loss and sore throat.    Respiratory:  Negative for cough and shortness of breath.    Cardiovascular:  Negative for chest pain.       Physical Exam  Constitutional:       General: She is not in acute distress.     Appearance: Normal appearance.   HENT:      Head: Normocephalic and atraumatic.      Right Ear: Tympanic membrane, ear canal and external ear normal.      Left Ear: Tympanic membrane, ear canal and external ear normal.      Nose:      Right Sinus: Frontal sinus tenderness present. No maxillary sinus tenderness.       Left Sinus: Frontal sinus tenderness present. No maxillary sinus tenderness.      Mouth/Throat:      Mouth: Mucous membranes are moist.      Pharynx: Oropharynx is clear. No oropharyngeal exudate or posterior oropharyngeal erythema.   Eyes:      Pupils: Pupils are equal, round, and reactive to light.   Cardiovascular:      Rate and Rhythm: Normal rate and regular rhythm.      Heart sounds: Normal heart sounds. No murmur heard.  Pulmonary:      Effort: Pulmonary effort is normal. No respiratory distress.      Breath sounds: Normal breath sounds. No wheezing.   Musculoskeletal:      Cervical back: Normal range of motion.   Skin:     General: Skin is warm.   Neurological:      Mental Status: She is alert and oriented to person, place, and time.   Psychiatric:         Behavior: Behavior normal.           Assessment and Plan:    1. Acute bacterial sinusitis  -     amoxicillin-clavulanate 875-125mg (AUGMENTIN) 875-125 mg per tablet; Take 1 tablet by mouth every 12 (twelve) hours. for 7 days  Dispense: 14 tablet; Refill: 0    2. Breast cancer screening by mammogram  -     Mammo Digital Screening Bilat w/ Wili; Future; Expected date: 10/24/2023        Visit summary:    Tori Quinn presented today for sinus pain.    Likely bacterial sinusitis, treating with Augmentin. F/u with worsening or persistent symptoms.     Ordered screening mammogram    Patient was instructed to report to ER if symptoms become severe.    Follow up: with any worsening or failure to improve      Marysol Cross PA-C    This note was created partially with voice dictation software and is prone to errors. This note has been reviewed by me but some errors are inevitable.

## 2023-12-18 DIAGNOSIS — F41.9 ANXIETY: ICD-10-CM

## 2023-12-18 RX ORDER — BUTALBITAL, ACETAMINOPHEN AND CAFFEINE 50; 325; 40 MG/1; MG/1; MG/1
TABLET ORAL
Qty: 10 TABLET | Refills: 0 | Status: SHIPPED | OUTPATIENT
Start: 2023-12-18

## 2023-12-18 RX ORDER — LORAZEPAM 0.5 MG/1
TABLET ORAL
Qty: 20 TABLET | Refills: 0 | Status: SHIPPED | OUTPATIENT
Start: 2023-12-18

## 2023-12-18 NOTE — TELEPHONE ENCOUNTER
Please review and advise for LORazepam (ATIVAN)     Last OV 10/24/2023  Last refill date 05/16/2023  20xor      Please approve for butalbital-acetaminophen-caffeine -40 mg (FIORICET, ESGIC)     Last OV 10/24/2023  Last refill date 07/10/2023  10x0r

## 2023-12-18 NOTE — TELEPHONE ENCOUNTER
Unable to retrieve patient chart and identify care due.  [facility] Embedded Care Due Messages. Reference number: [id number].   12/18/2023 3:25:45 PM CST

## 2023-12-26 ENCOUNTER — PATIENT MESSAGE (OUTPATIENT)
Dept: FAMILY MEDICINE | Facility: CLINIC | Age: 58
End: 2023-12-26
Payer: COMMERCIAL

## 2023-12-26 DIAGNOSIS — U07.1 COVID: Primary | ICD-10-CM

## 2023-12-26 DIAGNOSIS — U07.1 COVID-19: Primary | ICD-10-CM

## 2023-12-27 ENCOUNTER — TELEPHONE (OUTPATIENT)
Dept: FAMILY MEDICINE | Facility: CLINIC | Age: 58
End: 2023-12-27
Payer: COMMERCIAL

## 2023-12-27 NOTE — TELEPHONE ENCOUNTER
Spoke with Dr. Snyder and notified her per Patient she tested positive for COVID yesterday 12/26/23.

## 2023-12-27 NOTE — TELEPHONE ENCOUNTER
----- Message from Liliana Kumar sent at 12/27/2023 10:07 AM CST -----  Regarding: pharmacy  Contact: Dr. Snyder with [facility]  Type:  Pharmacy Calling to Clarify an RX    Name of Caller:  Dr. Snyder  Pharmacy Name:  [facility]  Prescription Name:  nirmatrelvir-ritonavir 300 mg (150 mg x 2)-100 mg copackaged tablets (EUA)  What do they need to clarify?:  date of Covid  Best Call Back Number:  [phone]  Additional Information:  Please call Dr. Snyder to clarify.  Thanks!

## 2023-12-28 ENCOUNTER — PATIENT MESSAGE (OUTPATIENT)
Dept: FAMILY MEDICINE | Facility: CLINIC | Age: 58
End: 2023-12-28
Payer: COMMERCIAL

## 2024-01-01 ENCOUNTER — PATIENT MESSAGE (OUTPATIENT)
Dept: FAMILY MEDICINE | Facility: CLINIC | Age: 59
End: 2024-01-01
Payer: COMMERCIAL

## 2024-01-02 ENCOUNTER — PATIENT MESSAGE (OUTPATIENT)
Dept: FAMILY MEDICINE | Facility: CLINIC | Age: 59
End: 2024-01-02
Payer: COMMERCIAL

## 2024-01-02 ENCOUNTER — ON-DEMAND VIRTUAL (OUTPATIENT)
Dept: URGENT CARE | Facility: CLINIC | Age: 59
End: 2024-01-02
Payer: COMMERCIAL

## 2024-01-02 DIAGNOSIS — U07.1 COVID-19: Primary | ICD-10-CM

## 2024-01-02 DIAGNOSIS — J32.9 SINUSITIS, UNSPECIFIED CHRONICITY, UNSPECIFIED LOCATION: ICD-10-CM

## 2024-01-02 PROCEDURE — 99213 OFFICE O/P EST LOW 20 MIN: CPT | Mod: 95,,, | Performed by: NURSE PRACTITIONER

## 2024-01-02 RX ORDER — FINASTERIDE 5 MG/1
5 TABLET, FILM COATED ORAL
COMMUNITY
Start: 2023-11-27

## 2024-01-02 RX ORDER — AMOXICILLIN AND CLAVULANATE POTASSIUM 875; 125 MG/1; MG/1
1 TABLET, FILM COATED ORAL EVERY 12 HOURS
Qty: 14 TABLET | Refills: 0 | Status: SHIPPED | OUTPATIENT
Start: 2024-01-02 | End: 2024-01-09

## 2024-01-02 NOTE — PROGRESS NOTES
Subjective:      Patient ID: Tori Quinn is a 58 y.o. female.    Vitals:  vitals were not taken for this visit.     Chief Complaint: COVID-19 Concerns      Visit Type: TELE AUDIOVISUAL    Present with the patient at the time of consultation: TELEMED PRESENT WITH PATIENT: None    Past Medical History:   Diagnosis Date    Anxiety and depression     Colon polyps 03/26/2019    Hair loss disorder      Past Surgical History:   Procedure Laterality Date    COLONOSCOPY W/ POLYPECTOMY  03/26/2019    HERNIA REPAIR      TONSILLECTOMY      TUBAL LIGATION       Review of patient's allergies indicates:  No Known Allergies  Current Outpatient Medications on File Prior to Visit   Medication Sig Dispense Refill    finasteride (PROSCAR) 5 mg tablet Take 5 mg by mouth.      azelastine (ASTELIN) 137 mcg (0.1 %) nasal spray 1 spray (137 mcg total) by Nasal route 2 (two) times daily. 30 mL 6    buPROPion (WELLBUTRIN XL) 150 MG TB24 tablet Take 1 tablet (150 mg total) by mouth once daily. 90 tablet 2    butalbital-acetaminophen-caffeine -40 mg (FIORICET, ESGIC) -40 mg per tablet One p.o. x1 headache.  Repeat 2 hr as needed 10 tablet 0    cyanocobalamin 1,000 mcg/mL injection Inject 1 mL (1,000 mcg total) into the muscle every 28 days. (Patient not taking: Reported on 10/24/2023) 1 mL 3    cyclobenzaprine (FLEXERIL) 10 MG tablet Take 1 tablet (10 mg total) by mouth every evening. (Patient not taking: Reported on 10/24/2023) 30 tablet 6    DULoxetine (CYMBALTA) 30 MG capsule Take 1 capsule (30 mg total) by mouth once daily. 90 capsule 2    EPINEPHrine (EPIPEN) 0.3 mg/0.3 mL AtIn Inject 0.3 mLs (0.3 mg total) into the muscle once. Dispense 1 kit for 1 dose 0.6 mL 0    LORazepam (ATIVAN) 0.5 MG tablet TAKE 1 TABLET BY MOUTH ONCE DAILY AS NEEDED UP TO 5 TIMES WEEKLY (SEDATION PRECAUTIONS) 20 tablet 0    meclizine (ANTIVERT) 25 mg tablet Take 1 tablet (25 mg total) by mouth 3 (three) times daily as needed for Dizziness.  "(Patient not taking: Reported on 10/24/2023) 30 tablet 0    minoxidiL (LONITEN) 2.5 MG tablet SMARTSI.25 Tablet(s) By Mouth Daily      [] nirmatrelvir-ritonavir 300 mg (150 mg x 2)-100 mg copackaged tablets (EUA) Take 3 tablets by mouth 2 (two) times daily for 5 days. Each dose contains 2 nirmatrelvir (pink tablets) and 1 ritonavir (white tablet). Take all 3 tablets together 30 tablet 0    progesterone (PROMETRIUM) 200 MG capsule Take 200 mg by mouth once daily.      spironolactone (ALDACTONE) 100 MG tablet Take 2 tablets (200 mg total) by mouth once daily. 180 tablet 1    syringe with needle 1 mL 25 gauge x 1" Syrg Inject 1 Syringe as directed every 30 days. Disp 1 box 3 each 3    topiramate (TOPAMAX) 25 MG tablet Take 25 mg by mouth 2 (two) times daily.      UNABLE TO FIND medication name: C* E2-Testosterone 4 pumps -20mg       No current facility-administered medications on file prior to visit.     Family History   Problem Relation Age of Onset    Arthritis Mother         poss rheum    Uterine cancer Mother 77        xrt, chemo     Depression Father         anxiety, ocd     Cancer Sister         Uterin cancer    Cancer Sister         female cancer    Cancer Maternal Grandmother         age 40 - female cancer     Learning disabilities Daughter         Severe sensory processing disorder    Anxiety disorder Daughter        Medications Ordered                [id number] [place] [address] AT SEC OF [address]ESS [facility] & [address] [address]    Telephone: [phone]   Fax: [phone]   Hours: Not open 24 hours                         E-Prescribed (1 of 1)              amoxicillin-clavulanate 875-125mg (AUGMENTIN) 875-125 mg per tablet    Sig: Take 1 tablet by mouth every 12 (twelve) hours. for 7 days       Start: 24     Quantity: 14 tablet Refills: 0                           Ohs Peq Odvv Intake    2024  8:47 AM CST - Filed by Patient "   Describe your reason for todays visit Get relief from Covid symptoms   What is your current physical address in the event of a medical emergency? [address] [facility] [address]   Are you able to take your vital signs? No   Please attach any relevant images or files          COVID symptoms for 12 days. Reports sinus pressure, headache, congestion, and ear fullness/ringing. Taking Tylenol only.        Constitution: Negative for chills and fever.   HENT:  Positive for ear pain, tinnitus, congestion, sinus pain and sinus pressure. Negative for ear discharge, sore throat, trouble swallowing and voice change.    Respiratory:  Negative for cough, shortness of breath and wheezing.    Gastrointestinal:  Negative for nausea, vomiting and diarrhea.   Neurological:  Positive for headaches.        Objective:   The physical exam was conducted virtually.  Physical Exam   Constitutional: She is oriented to person, place, and time. She does not appear ill. No distress.   HENT:   Head: Normocephalic and atraumatic.   Nose: Nose normal.   Eyes: Extraocular movement intact   Pulmonary/Chest: Effort normal.   Abdominal: Normal appearance.   Musculoskeletal: Normal range of motion.         General: Normal range of motion.   Neurological: no focal deficit. She is alert and oriented to person, place, and time.   Psychiatric: Her behavior is normal. Mood normal.   Vitals reviewed.      Assessment:     1. COVID-19    2. Sinusitis, unspecified chronicity, unspecified location        Plan:   Patient encouraged to monitor symptoms closely and instructed to follow-up for new or worsening symptoms. Further, in-person, evaluation may be necessary for continued treatment. Please follow up with your primary care doctor or specialist as needed. Verbally discussed plan. Patient confirms understanding and is in agreement with treatment and plan.     You must understand that you've received a [facility] evaluation only and that  you may be released before all your medical problems are known or treated. You, the patient, will arrange for follow up care as instructed.      COVID-19    Sinusitis, unspecified chronicity, unspecified location  -     amoxicillin-clavulanate 875-125mg (AUGMENTIN) 875-125 mg per tablet; Take 1 tablet by mouth every 12 (twelve) hours. for 7 days  Dispense: 14 tablet; Refill: 0      Patient Instructions       OVER THE COUNTER RECOMMENDATIONS/SUGGESTIONS.      ·         Make sure to stay well hydrated.     ·         Use Nasal Saline to mechanically move any post nasal drip from your eustachian tube or from the back of your throat.     ·         Use warm saltwater gargles to ease your throat pain. Warm saltwater gargles as needed for sore throat-  1/2 tsp salt to 1 cup warm water, gargle as desired.     ·         Use an antihistamine such as Claritin, Zyrtec or Allegra to dry you out.     ·         Use pseudoephedrine (behind the counter) to decongest. Pseudoephedrine  30 mg up to 240 mg /day. It can raise your blood pressure and give you palpitations.     ·         Use Mucinex (guaifenisin) to break up mucous up to 2400mg/day to loosen any mucous.     ·         The Mucinex DM pill has a cough suppressant that can be sedating. It can be used at night to stop the tickle at the back of your throat.     ·         You can use Mucinex D (it has guaifenesin and a high dose of pseudoephedrine) in the mornings to help decongest.     ·         Use Afrin (oxymetazoline) in each nare for no longer than 3 days, as it is addictive. It can also dry out your mucous membranes and cause elevated blood pressure. This is especially useful if you are flying.     ·         Use Flonase 1-2 sprays/nostril per day. It is a local acting steroid nasal spray, if you develop a bloody nose, stop using the medication immediately.     ·         Sometimes Nyquil at night is beneficial to help you get some rest, however it is sedating, and it does  have an antihistamine, and Tylenol.     ·         Honey is a natural cough suppressant that can be used.     ·         Tylenol up to 4,000 mg a day is safe for short periods and can be used for body aches, pain, and fever. However, in high doses and prolonged use it can cause liver irritation.     ·         Ibuprofen is a non-steroidal anti-inflammatory that can be used for body aches, pain, and fever. However, it can also cause stomach irritation if overused.     Here are some useful tips:     COVID-19 Self Care and Symptom Monitoring Program Now Available in [facility]     For community members seeking a COVID-19 test, we strongly recommend at-home testing, whether they are experiencing symptoms or want peace of mind knowing their COVID status. At-home tests can be purchased at major retailers and some local health departments are also giving away at-home testing kits to residents. The rapid at-home test kits are reliable, and many are the same tests [facility] utilizes in various healthcare settings.     If a patient recently tested positive for COVID-19, they can easily enroll in [facility]s free self-care and symptom monitoring program. By completing a quick form in [facility], they will be automatically registered into our COVID-19 Self Care and Symptom Monitoring Program. If symptoms worsen, our care team will be there to guide them on next steps and treatment options.     Click on this tip sheet for the COVID-19 Self Care and Symptom Monitoring Program.     Community Testing     Teams are still hosting drive-thru community testing sites throughout the system, so you can refer community members and patients to these sites as well but please note that many of these locations are administering PCR tests and results can take up to 72 hours. Dates and hours of operations for our community testing sites can be found on our website.      The Urgent cares have stopped asymptomatic testing due to the volumes of symptomatic  patients - we strongly recommend at-home testing.     Patient Advice     Schedule your booster shot now to protect yourself, your friends and your family.     Assess your individual personal risk before attending any event-outdoor gatherings are best!     Wear your mask unless you are actively eating or drinking.     Wash your hands frequently, cough or sneeze into your elbow or tissue.     Following potential exposure from travel or holiday parties, test for infection 3 to 5 days later with an at home test.     International travelers should be tested regardless of symptoms, vaccination status or history of COVID-19 infection 3 to 5 days after return.     If you test positive for COVID-19 while you are traveling, view this website to learn about treatments available to you where you are     Quarantine Guidelines:     - If you tested positive and do not have symptoms, you must isolate for 5 days starting on the day of the positive test.      - If you tested positive and have symptoms, you must isolate for 5 days starting on the day of the first symptoms, not the day of the positive test. ** This is the most important part, both the CDC and the [facility] emphasize that you do not test out of isolation. **     - After 5 days, if your symptoms have improved and you have not had fever on day 5, you can return to the community on day 6- NO TESTING REQUIRED!      - In fact, we do not retest if you were positive in the last 90 days.      - After your 5 days of isolation are completed, the CDC recommends strict mask use for the first 5 days that you come out of isolation.      CDC Testing and Quarantine Guidelines for patients with exposure to a known-positive COVID-19 person:         - A 'close exposure' is defined as anyone who has had an exposure (masked or unmasked) to a known COVID -19 positive person within 6 feet of someone for a cumulative total of 15 minutes or more over a 24-hour period.         - Vaccinated, Have been  boosted or completed the primary series of Pfizer or Moderna vaccine within the last 6 months or completed the primary series of J&J vaccine within the last 2 months and/or had a positive test within 90 days:      - do NOT need to quarantine after contact with someone who had COVID-19 unless they have symptoms.      - fully vaccinated people who have not had a positive test within 90 days, should get tested 3-5 days after their exposure, even if they don't have symptoms and wear a mask indoors in public for 10 days following exposure or until their test result is negative on day 5.      - If you develop symptoms test and quarantine.      - Unvaccinated, or are more than six months out from their second mRNA dose (or more than 2 months after the J&J vaccine) and not yet boosted, and/or NOT had a positive test within 90 days and meet 'close exposure':       - you are required by CDC guidelines to quarantine for at least 5 days from time of exposure followed by 5 days of strict masking. It is recommended, but not required to test after 5 days, unless you develop symptoms, in which case you should test at that time.      - If you do decide to test at 5 days and are asymptomatic, the risk is that if you test without symptoms on Day 5 for example and you are positive, your 5 day isolation begins on that day, and you turned your 5 day quarantine into 10 days.      - If your exposure does not meet the above definition, you can return to your normal daily activities to include social distancing, wearing a mask and frequent handwashing.      Alternatively, if a 5-day quarantine is not feasible, it is imperative that an exposed person wear a well-fitting mask at all times when around others for 10 days after exposure.      Sites for community testing     https://ldh.la.gov/index.cfm/page/3934?clearCache=1        https://www.[facility].org/testing     ·         As you know the urgent cares, emergency rooms and primary care offices  are starting to get busier with the rapid rise of COVID and as such [facility] is taking walk ups for testing:      §  Check-in via [facility] account or by calling [phone] upon arrival to be checked in remotely      §  Testing Available: PCR Testing (NOT Rapid Test)      Dexamethasone/Corticosteroids     Recommendation Against the Use of Corticosteroids to Treat Outpatients with COVID-19 and Recommendations for Vaccination of Persons with Known or Previous COVID-19 Infection           https://www.qnrpv21adgdlijiljcnlgnhdlp.nih.gov/management/clinical-management/nonhospitalized-adults--therapeutic-management/      ·         COVID-19 vaccination and SARS-CoV-2 infection     https://www.cdc.gov/vaccines/covid-19/clinical-considerations/covid-19-vaccines-[place].html#CoV-19-vaccination

## 2024-01-02 NOTE — PATIENT INSTRUCTIONS
OVER THE COUNTER RECOMMENDATIONS/SUGGESTIONS.      ·         Make sure to stay well hydrated.     ·         Use Nasal Saline to mechanically move any post nasal drip from your eustachian tube or from the back of your throat.     ·         Use warm saltwater gargles to ease your throat pain. Warm saltwater gargles as needed for sore throat-  1/2 tsp salt to 1 cup warm water, gargle as desired.     ·         Use an antihistamine such as Claritin, Zyrtec or Allegra to dry you out.     ·         Use pseudoephedrine (behind the counter) to decongest. Pseudoephedrine  30 mg up to 240 mg /day. It can raise your blood pressure and give you palpitations.     ·         Use Mucinex (guaifenisin) to break up mucous up to 2400mg/day to loosen any mucous.     ·         The Mucinex DM pill has a cough suppressant that can be sedating. It can be used at night to stop the tickle at the back of your throat.     ·         You can use Mucinex D (it has guaifenesin and a high dose of pseudoephedrine) in the mornings to help decongest.     ·         Use Afrin (oxymetazoline) in each nare for no longer than 3 days, as it is addictive. It can also dry out your mucous membranes and cause elevated blood pressure. This is especially useful if you are flying.     ·         Use Flonase 1-2 sprays/nostril per day. It is a local acting steroid nasal spray, if you develop a bloody nose, stop using the medication immediately.     ·         Sometimes Nyquil at night is beneficial to help you get some rest, however it is sedating, and it does have an antihistamine, and Tylenol.     ·         Honey is a natural cough suppressant that can be used.     ·         Tylenol up to 4,000 mg a day is safe for short periods and can be used for body aches, pain, and fever. However, in high doses and prolonged use it can cause liver irritation.     ·         Ibuprofen is a non-steroidal anti-inflammatory that can be used for body aches, pain, and fever.  However, it can also cause stomach irritation if overused.     Here are some useful tips:     COVID-19 Self Care and Symptom Monitoring Program Now Available in [facility]     For community members seeking a COVID-19 test, we strongly recommend at-home testing, whether they are experiencing symptoms or want peace of mind knowing their COVID status. At-home tests can be purchased at major retailers and some local health departments are also giving away at-home testing kits to residents. The rapid at-home test kits are reliable, and many are the same tests [facility] utilizes in various healthcare settings.     If a patient recently tested positive for COVID-19, they can easily enroll in [facility] free self-care and symptom monitoring program. By completing a quick form in [facility], they will be automatically registered into our COVID-19 Self Care and Symptom Monitoring Program. If symptoms worsen, our care team will be there to guide them on next steps and treatment options.     Click on this tip sheet for the COVID-19 Self Care and Symptom Monitoring Program.     Community Testing     Teams are still hosting drive-thru community testing sites throughout the system, so you can refer community members and patients to these sites as well but please note that many of these locations are administering PCR tests and results can take up to 72 hours. Dates and hours of operations for our community testing sites can be found on our website.      The Urgent cares have stopped asymptomatic testing due to the volumes of symptomatic patients - we strongly recommend at-home testing.     Patient Advice     Schedule your booster shot now to protect yourself, your friends and your family.     Assess your individual personal risk before attending any event-outdoor gatherings are best!     Wear your mask unless you are actively eating or drinking.     Wash your hands frequently, cough or sneeze into your elbow or tissue.     Following  potential exposure from travel or holiday parties, test for infection 3 to 5 days later with an at home test.     International travelers should be tested regardless of symptoms, vaccination status or history of COVID-19 infection 3 to 5 days after return.     If you test positive for COVID-19 while you are traveling, view this website to learn about treatments available to you where you are     Quarantine Guidelines:     - If you tested positive and do not have symptoms, you must isolate for 5 days starting on the day of the positive test.      - If you tested positive and have symptoms, you must isolate for 5 days starting on the day of the first symptoms, not the day of the positive test. ** This is the most important part, both the CDC and the [facility] emphasize that you do not test out of isolation. **     - After 5 days, if your symptoms have improved and you have not had fever on day 5, you can return to the community on day 6- NO TESTING REQUIRED!      - In fact, we do not retest if you were positive in the last 90 days.      - After your 5 days of isolation are completed, the CDC recommends strict mask use for the first 5 days that you come out of isolation.      CDC Testing and Quarantine Guidelines for patients with exposure to a known-positive COVID-19 person:         - A 'close exposure' is defined as anyone who has had an exposure (masked or unmasked) to a known COVID -19 positive person within 6 feet of someone for a cumulative total of 15 minutes or more over a 24-hour period.         - Vaccinated, Have been boosted or completed the primary series of Pfizer or Moderna vaccine within the last 6 months or completed the primary series of J&J vaccine within the last 2 months and/or had a positive test within 90 days:      - do NOT need to quarantine after contact with someone who had COVID-19 unless they have symptoms.      - fully vaccinated people who have not had a positive test within 90 days, should get  tested 3-5 days after their exposure, even if they don't have symptoms and wear a mask indoors in public for 10 days following exposure or until their test result is negative on day 5.      - If you develop symptoms test and quarantine.      - Unvaccinated, or are more than six months out from their second mRNA dose (or more than 2 months after the J&J vaccine) and not yet boosted, and/or NOT had a positive test within 90 days and meet 'close exposure':       - you are required by CDC guidelines to quarantine for at least 5 days from time of exposure followed by 5 days of strict masking. It is recommended, but not required to test after 5 days, unless you develop symptoms, in which case you should test at that time.      - If you do decide to test at 5 days and are asymptomatic, the risk is that if you test without symptoms on Day 5 for example and you are positive, your 5 day isolation begins on that day, and you turned your 5 day quarantine into 10 days.      - If your exposure does not meet the above definition, you can return to your normal daily activities to include social distancing, wearing a mask and frequent handwashing.      Alternatively, if a 5-day quarantine is not feasible, it is imperative that an exposed person wear a well-fitting mask at all times when around others for 10 days after exposure.      Sites for community testing     https://ldh.la.gov/index.cfm/page/3934?clearCache=1        https://www.[employer].org/testing     ·         As you know the urgent cares, emergency rooms and primary care offices are starting to get busier with the rapid rise of COVID and as such [facility] is taking walk ups for testing:      §  Check-in via [facility] account or by calling [phone] upon arrival to be checked in remotely      §  Testing Available: PCR Testing (NOT Rapid Test)      Dexamethasone/Corticosteroids     Recommendation Against the Use of Corticosteroids to Treat Outpatients with COVID-19 and  Recommendations for Vaccination of Persons with Known or Previous COVID-19 Infection           https://www.xrqwn67xjxjolvxmxkfxmhbsht.nih.gov/management/clinical-management/nonhospitalized-adults--therapeutic-management/      ·         COVID-19 vaccination and SARS-CoV-2 infection     https://www.cdc.gov/vaccines/covid-19/clinical-considerations/covid-19-vaccines-[place].html#CoV-19-vaccination

## 2024-01-05 ENCOUNTER — ON-DEMAND VIRTUAL (OUTPATIENT)
Dept: URGENT CARE | Facility: CLINIC | Age: 59
End: 2024-01-05
Payer: COMMERCIAL

## 2024-01-05 DIAGNOSIS — J98.8 VIRAL RESPIRATORY ILLNESS: Primary | ICD-10-CM

## 2024-01-05 DIAGNOSIS — B97.89 VIRAL RESPIRATORY ILLNESS: Primary | ICD-10-CM

## 2024-01-05 PROCEDURE — 99213 OFFICE O/P EST LOW 20 MIN: CPT | Mod: 95,,,

## 2024-01-05 RX ORDER — METHYLPREDNISOLONE 4 MG/1
TABLET ORAL
Qty: 21 EACH | Refills: 0 | Status: SHIPPED | OUTPATIENT
Start: 2024-01-05 | End: 2024-01-05

## 2024-01-05 RX ORDER — METHYLPREDNISOLONE 4 MG/1
TABLET ORAL
Qty: 21 EACH | Refills: 0 | Status: SHIPPED | OUTPATIENT
Start: 2024-01-05 | End: 2024-01-26

## 2024-01-05 RX ORDER — FLUTICASONE PROPIONATE 50 MCG
1 SPRAY, SUSPENSION (ML) NASAL DAILY
Qty: 9.9 ML | Refills: 0 | Status: SHIPPED | OUTPATIENT
Start: 2024-01-05

## 2024-01-05 NOTE — PROGRESS NOTES
Subjective:      Patient ID: Tori Quinn is a 58 y.o. female.    Vitals:  vitals were not taken for this visit.     Chief Complaint: Sinus Problem      Visit Type: TELE AUDIOVISUAL    Present with the patient at the time of consultation: TELEMED PRESENT WITH PATIENT: None    Past Medical History:   Diagnosis Date    Anxiety and depression     Colon polyps 03/26/2019    Hair loss disorder      Past Surgical History:   Procedure Laterality Date    COLONOSCOPY W/ POLYPECTOMY  03/26/2019    HERNIA REPAIR      TONSILLECTOMY      TUBAL LIGATION       Review of patient's allergies indicates:  No Known Allergies  Current Outpatient Medications on File Prior to Visit   Medication Sig Dispense Refill    amoxicillin-clavulanate 875-125mg (AUGMENTIN) 875-125 mg per tablet Take 1 tablet by mouth every 12 (twelve) hours. for 7 days 14 tablet 0    azelastine (ASTELIN) 137 mcg (0.1 %) nasal spray 1 spray (137 mcg total) by Nasal route 2 (two) times daily. 30 mL 6    buPROPion (WELLBUTRIN XL) 150 MG TB24 tablet Take 1 tablet (150 mg total) by mouth once daily. 90 tablet 2    butalbital-acetaminophen-caffeine -40 mg (FIORICET, ESGIC) -40 mg per tablet One p.o. x1 headache.  Repeat 2 hr as needed 10 tablet 0    cyanocobalamin 1,000 mcg/mL injection Inject 1 mL (1,000 mcg total) into the muscle every 28 days. (Patient not taking: Reported on 10/24/2023) 1 mL 3    cyclobenzaprine (FLEXERIL) 10 MG tablet Take 1 tablet (10 mg total) by mouth every evening. (Patient not taking: Reported on 10/24/2023) 30 tablet 6    DULoxetine (CYMBALTA) 30 MG capsule Take 1 capsule (30 mg total) by mouth once daily. 90 capsule 2    EPINEPHrine (EPIPEN) 0.3 mg/0.3 mL AtIn Inject 0.3 mLs (0.3 mg total) into the muscle once. Dispense 1 kit for 1 dose 0.6 mL 0    finasteride (PROSCAR) 5 mg tablet Take 5 mg by mouth.      LORazepam (ATIVAN) 0.5 MG tablet TAKE 1 TABLET BY MOUTH ONCE DAILY AS NEEDED UP TO 5 TIMES WEEKLY (SEDATION  "PRECAUTIONS) 20 tablet 0    meclizine (ANTIVERT) 25 mg tablet Take 1 tablet (25 mg total) by mouth 3 (three) times daily as needed for Dizziness. (Patient not taking: Reported on 10/24/2023) 30 tablet 0    minoxidiL (LONITEN) 2.5 MG tablet SMARTSI.25 Tablet(s) By Mouth Daily      progesterone (PROMETRIUM) 200 MG capsule Take 200 mg by mouth once daily.      spironolactone (ALDACTONE) 100 MG tablet Take 2 tablets (200 mg total) by mouth once daily. 180 tablet 1    syringe with needle 1 mL 25 gauge x 1" Syrg Inject 1 Syringe as directed every 30 days. Disp 1 box 3 each 3    topiramate (TOPAMAX) 25 MG tablet Take 25 mg by mouth 2 (two) times daily.      UNABLE TO FIND medication name: C* E2-Testosterone 4 pumps -20mg       No current facility-administered medications on file prior to visit.     Family History   Problem Relation Age of Onset    Arthritis Mother         poss rheum    Uterine cancer Mother 77        xrt, chemo     Depression Father         anxiety, ocd     Cancer Sister         Uterin cancer    Cancer Sister         female cancer    Cancer Maternal Grandmother         age 40 - female cancer     Learning disabilities Daughter         Severe sensory processing disorder    Anxiety disorder Daughter        Medications Ordered                [id number] [place] [address] AT [address] [facility] & [address] [address]    Telephone: [phone]   Fax: [phone]   Hours: Not open 24 hours                         E-Prescribed (2 of 2)              fluticasone propionate (FLONASE) 50 mcg/actuation nasal spray    Si spray (50 mcg total) by Each Nostril route once daily.       Start: 24     Quantity: 9.9 mL Refills: 0                         methylPREDNISolone (MEDROL DOSEPACK) 4 mg tablet    Sig: use as directed       Start: 24     Quantity: 21 each Refills: 0                           Ohs Peq Odvv Intake    2024 12:37 PM CST - " Filed by Patient   Describe your reason for todays visit Symptoms from Covid with no relief   What is your current physical address in the event of a medical emergency? [address] [facility] [address]   Are you able to take your vital signs? No   Please attach any relevant images or files          Patient states that since December 21st she has been having sinus pressure with congestion and cough.  Patient states that she tested positive for covid on December 26th. Patient states that she was seen three days ago and given antibiotics for this patient states that she is taking this along with otc medications and is continuing to have sinus pressure, sinus pain, ear fullness and fatigue. Patient states that she is not having any other symptoms at this time     Sinus Problem  Associated symptoms include congestion, ear pain and sinus pressure.       Constitution: Positive for fatigue.   HENT:  Positive for ear pain, congestion and sinus pressure.    Neck: neck negative.   Cardiovascular: Negative.    Eyes: Negative.    Respiratory: Negative.     Gastrointestinal: Negative.    Endocrine: negative.   Genitourinary: Negative.    Musculoskeletal: Negative.    Skin: Negative.    Allergic/Immunologic: Negative.    Neurological: Negative.    Hematologic/Lymphatic: Negative.    Psychiatric/Behavioral: Negative.          Objective:   The physical exam was conducted virtually.  Physical Exam   Constitutional: She is oriented to person, place, and time.   HENT:   Head: Normocephalic and atraumatic.   Nose: Congestion present. Right sinus exhibits maxillary sinus tenderness and frontal sinus tenderness. Left sinus exhibits maxillary sinus tenderness and frontal sinus tenderness.   Eyes: Conjunctivae are normal. Pupils are equal, round, and reactive to light. Extraocular movement intact   Neck: Neck supple.   Pulmonary/Chest: Effort normal.   Abdominal: Normal appearance.   Musculoskeletal: Normal range of motion.          General: Normal range of motion.   Neurological: no focal deficit. She is alert, oriented to person, place, and time and at baseline.   Skin: Skin is warm.   Psychiatric: Her behavior is normal. Mood, judgment and thought content normal.       Assessment:     1. Viral respiratory illness        Plan:       Viral respiratory illness  -     Discontinue: methylPREDNISolone (MEDROL DOSEPACK) 4 mg tablet; use as directed  Dispense: 21 each; Refill: 0  -     fluticasone propionate (FLONASE) 50 mcg/actuation nasal spray; 1 spray (50 mcg total) by Each Nostril route once daily.  Dispense: 9.9 mL; Refill: 0  -     methylPREDNISolone (MEDROL DOSEPACK) 4 mg tablet; use as directed  Dispense: 21 each; Refill: 0

## 2024-01-18 ENCOUNTER — OFFICE VISIT (OUTPATIENT)
Dept: OBSTETRICS AND GYNECOLOGY | Facility: CLINIC | Age: 59
End: 2024-01-18
Payer: COMMERCIAL

## 2024-01-18 VITALS
SYSTOLIC BLOOD PRESSURE: 108 MMHG | HEIGHT: 64 IN | WEIGHT: 177.69 LBS | BODY MASS INDEX: 30.34 KG/M2 | DIASTOLIC BLOOD PRESSURE: 70 MMHG

## 2024-01-18 DIAGNOSIS — Z79.890 HORMONE REPLACEMENT THERAPY (HRT): Primary | ICD-10-CM

## 2024-01-18 DIAGNOSIS — Z12.31 ENCOUNTER FOR SCREENING MAMMOGRAM FOR BREAST CANCER: ICD-10-CM

## 2024-01-18 PROCEDURE — 99203 OFFICE O/P NEW LOW 30 MIN: CPT | Mod: S$GLB,,, | Performed by: OBSTETRICS & GYNECOLOGY

## 2024-01-18 PROCEDURE — 99999 PR PBB SHADOW E&M-EST. PATIENT-LVL III: CPT | Mod: PBBFAC,,, | Performed by: OBSTETRICS & GYNECOLOGY

## 2024-01-18 NOTE — PROGRESS NOTES
Chief Complaint   Patient presents with    hormone consult     Having varying emotions, no libido, vaginal dryness for last 2 years       History and Physical:  Tori Quinn is a 58 y.o. White female who presents today for to discuss menopausal symptoms.      Having varying emotions, no libido, vaginal dryness for last 2 years        No LMP recorded. Patient is postmenopausal.  Years since menopause onset? 56; FSH 5/2022 61.5  Hysterectomy? No  Current HRT? Yes  Testosterone & estrogen cream. Started medication due to lidido and vaginal dryness. Vaginal dryness improved. Used it for about six months, stopped 4 months ago due to medication not making her feel good anymore.   Prometrium 200 mg daily. Started due menopausal issues. Stated it about 6 months ago, took for 6 months, stopped 4 months ago.     ROS:   Are symptoms moderate to severe, bothersome symptoms that interfere with daily activities, impair quality of life, and/or interrupt sleep? yes  Hot flashes Yes, daily, about 10 times  Night Sweats Yes, occasionally  Sleep disturbance No  Depression/Anxiety: Yes, taking Wellbutrin, Cymbalta, & Ativan PRN  Vaginal dryness Yes  Dyspareunia No  Sexual dysfunction Yes  Cognitive changes Yes  Complains of emotional    HRT Risk Factors:  Prior CAD, MI, stroke/TIA, PE/VTE? No  Thrombophilia or APS? No  Breast & Endometrial Cancer? No  Estrogen dependent neoplasm? No  Liver disease? No  Undiagnosed abnormal genital bleeding? No  Smoker? No  HTN? No, Meds? No, There were no vitals filed for this visit.  DM? No  HLD? No, Meds? No,   Lab Results   Component Value Date/Time    CHOL 209 (H) 03/30/2023 08:36 AM    HDL 43 03/30/2023 08:36 AM    Gallbladder disease, Obesity, Migraines w/ aura?  Yes, BMI 30  Family history of heart disease? Yes, Dad CHF  Family history of breast cancer or BRCA? No  Last MMG? 6/2021 BIRADS 1; Tyrer-Cuzick risk 7%; 2yr ago  Family history of dementia? Yes, mother  DEXA? 6/2021 Osteopenia,  no meds    Allergies: Review of patient's allergies indicates:  No Known Allergies  Past Medical History:   Diagnosis Date    Anxiety and depression     Colon polyps 03/26/2019    Hair loss disorder      Past Surgical History:   Procedure Laterality Date    COLONOSCOPY W/ POLYPECTOMY  03/26/2019    HERNIA REPAIR      TONSILLECTOMY      TUBAL LIGATION       MEDS:   Current Outpatient Medications on File Prior to Visit   Medication Sig Dispense Refill    azelastine (ASTELIN) 137 mcg (0.1 %) nasal spray 1 spray (137 mcg total) by Nasal route 2 (two) times daily. 30 mL 6    buPROPion (WELLBUTRIN XL) 150 MG TB24 tablet Take 1 tablet (150 mg total) by mouth once daily. 90 tablet 2    butalbital-acetaminophen-caffeine -40 mg (FIORICET, ESGIC) -40 mg per tablet One p.o. x1 headache.  Repeat 2 hr as needed 10 tablet 0    cyanocobalamin 1,000 mcg/mL injection Inject 1 mL (1,000 mcg total) into the muscle every 28 days. (Patient not taking: Reported on 10/24/2023) 1 mL 3    cyclobenzaprine (FLEXERIL) 10 MG tablet Take 1 tablet (10 mg total) by mouth every evening. (Patient not taking: Reported on 10/24/2023) 30 tablet 6    DULoxetine (CYMBALTA) 30 MG capsule Take 1 capsule (30 mg total) by mouth once daily. 90 capsule 2    EPINEPHrine (EPIPEN) 0.3 mg/0.3 mL AtIn Inject 0.3 mLs (0.3 mg total) into the muscle once. Dispense 1 kit for 1 dose 0.6 mL 0    finasteride (PROSCAR) 5 mg tablet Take 5 mg by mouth.      fluticasone propionate (FLONASE) 50 mcg/actuation nasal spray 1 spray (50 mcg total) by Each Nostril route once daily. 9.9 mL 0    LORazepam (ATIVAN) 0.5 MG tablet TAKE 1 TABLET BY MOUTH ONCE DAILY AS NEEDED UP TO 5 TIMES WEEKLY (SEDATION PRECAUTIONS) 20 tablet 0    meclizine (ANTIVERT) 25 mg tablet Take 1 tablet (25 mg total) by mouth 3 (three) times daily as needed for Dizziness. (Patient not taking: Reported on 10/24/2023) 30 tablet 0    methylPREDNISolone (MEDROL DOSEPACK) 4 mg tablet use as directed 21  "each 0    minoxidiL (LONITEN) 2.5 MG tablet SMARTSI.25 Tablet(s) By Mouth Daily      progesterone (PROMETRIUM) 200 MG capsule Take 200 mg by mouth once daily.      spironolactone (ALDACTONE) 100 MG tablet Take 2 tablets (200 mg total) by mouth once daily. 180 tablet 1    syringe with needle 1 mL 25 gauge x 1" Syrg Inject 1 Syringe as directed every 30 days. Disp 1 box 3 each 3    topiramate (TOPAMAX) 25 MG tablet Take 25 mg by mouth 2 (two) times daily.      UNABLE TO FIND medication name: C* E2-Testosterone 4 pumps -20mg       No current facility-administered medications on file prior to visit.     OB History    No obstetric history on file.       Social History     Socioeconomic History    Marital status:     Number of children: 3   Occupational History    Occupation: para teacher    Tobacco Use    Smoking status: Never    Smokeless tobacco: Never   Substance and Sexual Activity    Alcohol use: Yes     Alcohol/week: 2.0 standard drinks of alcohol     Types: 1 Glasses of wine, 1 Cans of beer per week    Drug use: Never    Sexual activity: Yes     Partners: Male     Birth control/protection: Post-menopausal     Family History   Problem Relation Age of Onset    Arthritis Mother         poss rheum    Uterine cancer Mother 77        xrt, chemo     Depression Father         anxiety, ocd     Cancer Sister         Uterin cancer    Cancer Sister         female cancer    Cancer Maternal Grandmother         age 40 - female cancer     Learning disabilities Daughter         Severe sensory processing disorder    Anxiety disorder Daughter        Physical Exam:   There were no vitals taken for this visit.  Constitutional: She appears alert and responsive. She appears well-developed, well-groomed, and well-nourished. No distress.  Genitourinary:  Deferred.     Assessment/Plan:   Hormone replacement therapy (HRT)  -     estradiol-norethindrone (COMBIPATCH) 0.05-0.14 mg/24 hr; Place 1 patch onto the skin twice a week. " Apply transdermally to the lower abdomen; system is to be worn continuously; replace patch every 3 to 4 days (twice weekly)  Dispense: 8 patch; Refill: 11      She will start Combipatch for hot flashes, vaginal dryness, and osteoporosis prevention.     HRT:   Lifestyles solutions such as layering clothing, maintaining lower ambient temperature, and consuming cool drinks are reasonable measure for management.   Risk, benefits and alternatives to hormone replacement discussed. Start lowest dose for the shortest duration to relieve menopausal symptoms.  Combined HRT is associated with small risks in coronary heart disease, pulmonary embolism, breast cancer, and dementia; but decreased risks in colon cancer and hip fracture.   Safer alternative may be available to treat the symptoms of menopause:   Paxil 7.5mg daily, adverse effects nausea, dizziness, constipation, and sexual dysfunction  Gabapentin 600-900mg daily, adverse effects dizziness, peripheral edema, & somnolence  Clonidine .1mg qHS, adverse effects dry mouth, insomnia, & drowsiness  Patient decided to proceed with therapy.     Vaginal Atrophy:   Recommended vaginal lubricants & moisturizers.   Discussed Vaginal estrogen: Pill, Cream, or Ring.   Ospemifene 60mg daily for treating moderate to severe dyspareunia in postmenopausal women.  Plan to start with Combopatch.    Libido:   Female sexual dysfunction may take different forms, including lack of sexual desire, impaired arousal, inability to achieve orgasm, pain with sexual activity, or a combination of these issues. Treatment must be tailored to the sexual dysfunction diagnosis or diagnoses and to underlying physical, psychological, and relationship factors.    Initial treatment measures often include vaginal lubricants and moisturizers, lifestyle changes (eg, reducing stress and fatigue, increasing quality time with partners, bringing novelty to the sexual repertoire, and improving body image), and couples  "and/or sex therapy.  Referral to a sex therapist or couples counselor is a highly effective intervention for sexual dysfunction.  Pelvic physical therapists are often needed to address specific problems, including pelvic floor hypertonus (vaginismus), pelvic pain, urinary or fecal incontinence, or pelvic organ prolapse.    Women treated for sexual issues with medication should be those who do not respond to nonpharmacologic interventions    Non Medication treatment:   -Lab testing not recommended  -make sex a priority  -Scheduled intercourse discussed  -work on communication with possible sex therapist or CBT. Read "Better Sex Through Mindfulness" and "Come As You Are"  -"Viji" clemente recommended  -Possibly consider erotica or even vibrator    Hhormone therapy and medication options:   -Bupropion: off label, alternative if taking SSRI    -Addyi (Fibanserin) is only approved in premenopausal women.     -Vyleesi (Bremeloanotide) is only approved in premenopausal women.    -Testosterone  Only FDA approved for postmenopausal women. No long term studies. Possible risks: Deepening voice, clitoromegaly, acne, unwanted hair growth, negative impact on lipids. May increase 1-2 episodes per week vs placebo. Pellets are not recommended. Dose: Compounded 1% gel 0.5g/day abdomen daily. Normal lipids & BP; monitor BP and lipids. Monitor free testosterone and androgen index, goal normal range for woman. Stop is no improvement after 12-24 weeks.   Candidates for therapy must have no contraindications to taking androgens or estrogens (as many androgens are aromatized to estrogens), be willing to accept androgenic side effects, and be counseled that this therapy is not approved in women by the US Food and Drug Administration due to a lack of long-term safety data. Prescribing a testosterone preparation to women has many limitations. A commonly used preparation is compounded 1% testosterone topical cream or gel. Testosterone products " approved for men may be dose adjusted for women, prescribing topical compounded 1% testosterone cream or gel (0.5 grams daily) applied to the skin of the arms, legs, or abdomen is less likely to result in excessive dosing.

## 2024-01-24 RX ORDER — SPIRONOLACTONE 100 MG/1
TABLET, FILM COATED ORAL
Qty: 180 TABLET | Refills: 0 | Status: SHIPPED | OUTPATIENT
Start: 2024-01-24 | End: 2024-05-09

## 2024-01-24 NOTE — TELEPHONE ENCOUNTER
Care Due:                  Date            Visit Type   Department     Provider  --------------------------------------------------------------------------------                                MYCHART                              ANNUAL                              CHECKUP/PHY  [facility]  Last Visit: 04-      [facility]       Philippe Hahn  Next Visit: None Scheduled  None         None Found                                                            Last  Test          Frequency    Reason                     Performed    Due Date  --------------------------------------------------------------------------------    CMP.........  12 months..  DULoxetine,                03- 03-                             spironolactone...........    [employer] Embedded Care Due Messages. Reference number: [id number].   1/24/2024 7:31:38 AM CST

## 2024-01-24 NOTE — TELEPHONE ENCOUNTER
Provider Staff:  Action required for this patient    Requires labs      Please see care gap opportunities below in Care Due Message.    Thanks!  [facility]     Appointments      Date Provider   Last Visit   4/3/2023 Philippe Hahn MD   Next Visit   Visit date not found Philippe Hahn MD     Refill Decision Note   Tori Quinn  is requesting a refill authorization.  Brief Assessment and Rationale for Refill:  Approve     Medication Therapy Plan:         Comments:     Note composed:7:57 AM 01/24/2024             Appointments     Last Visit   4/3/2023 Philippe Hahn MD   Next Visit   Visit date not found Philippe Hahn MD

## 2024-03-06 ENCOUNTER — PATIENT MESSAGE (OUTPATIENT)
Dept: FAMILY MEDICINE | Facility: CLINIC | Age: 59
End: 2024-03-06
Payer: COMMERCIAL

## 2024-03-06 ENCOUNTER — E-VISIT (OUTPATIENT)
Dept: INTERNAL MEDICINE | Facility: CLINIC | Age: 59
End: 2024-03-06
Payer: COMMERCIAL

## 2024-03-06 DIAGNOSIS — B96.89 ACUTE BACTERIAL SINUSITIS: Primary | ICD-10-CM

## 2024-03-06 DIAGNOSIS — J01.90 ACUTE BACTERIAL SINUSITIS: Primary | ICD-10-CM

## 2024-03-06 PROCEDURE — 99421 OL DIG E/M SVC 5-10 MIN: CPT | Mod: ,,, | Performed by: INTERNAL MEDICINE

## 2024-03-06 RX ORDER — FLUTICASONE PROPIONATE 50 MCG
1 SPRAY, SUSPENSION (ML) NASAL DAILY
Qty: 16 G | Refills: 0 | Status: SHIPPED | OUTPATIENT
Start: 2024-03-06

## 2024-03-06 RX ORDER — LEVOCETIRIZINE DIHYDROCHLORIDE 5 MG/1
5 TABLET, FILM COATED ORAL NIGHTLY
Qty: 30 TABLET | Refills: 0 | Status: SHIPPED | OUTPATIENT
Start: 2024-03-06 | End: 2025-03-06

## 2024-03-06 RX ORDER — AMOXICILLIN AND CLAVULANATE POTASSIUM 875; 125 MG/1; MG/1
1 TABLET, FILM COATED ORAL 2 TIMES DAILY
Qty: 14 TABLET | Refills: 0 | Status: SHIPPED | OUTPATIENT
Start: 2024-03-06 | End: 2024-03-13

## 2024-03-06 NOTE — PROGRESS NOTES
Patient ID: Tori Quinn is a 58 y.o. female.    Chief Complaint: URI (Entered automatically based on patient selection in Patient Portal.)    The patient initiated a request through [employer] on 3/6/2024 for evaluation and management with a chief complaint of URI (Entered automatically based on patient selection in Patient Portal.)     I evaluated the questionnaire submission on 3/6/24.    Ohs Peq Evisit Upper Respitatory/Cough Questionnaire    3/6/2024  3:09 PM CST - Filed by Patient   Do you agree to participate in an E-Visit? Yes   If you have any of the following symptoms, please present to your local ER or call 911:  I acknowledge   Choose the state of your primary residence [place]   What is the main issue that you would like for your doctor to address today? Sinus infection   Are you able to take your vital signs? No   What symptoms do you currently have?  Fatigue;  Headache;  Nasal Congestion;  Runny nose   Have you ever smoked? I have never smoked   Have you had a fever? No   When did your symptoms first appear? 2/29/2024   In the last two weeks, have you been in close contact with someone who has COVID-19 or the Flu? No   In the last two weeks, have you worked or volunteered in a healthcare facility or as a ? Healthcare facilities include a hospital, medical or dental clinic, long-term care facility, or nursing home No   Do you live in a long-term care facility, nursing home, group home, or homeless shelter? No   List what you have done or taken to help your symptoms. Angela pot, tylenol sinus, hot showers, sleeping upright position   How severe are your symptoms? Moderate   Have your symptoms improved since they first appeared? No change   Have you taken an at home Covid test? Yes   What were the results? Negative   Have you taken a Flu test? No   Have you been fully vaccinated for COVID? (2 Pfizer, 2 Moderna or 1 Gabe & Gabe vaccine injections) No   Have you received your  first dose of the Pfizer or Moderna COVID vaccine? Yes   Have you recieved a Flu shot? No   Do you have transportation to get tested for COVID if it is indicated and ordered for you at an [facility] location? Yes   Provide any information you feel is important to your history not asked above    Please attach any relevant images or files          Encounter Diagnosis   Name Primary?    Acute bacterial sinusitis Yes        No orders of the defined types were placed in this encounter.     Medications Ordered This Encounter   Medications    amoxicillin-clavulanate 875-125mg (AUGMENTIN) 875-125 mg per tablet     Sig: Take 1 tablet by mouth 2 (two) times daily. for 7 days     Dispense:  14 tablet     Refill:  0    fluticasone propionate (FLONASE) 50 mcg/actuation nasal spray     Si spray (50 mcg total) by Each Nostril route once daily.     Dispense:  16 g     Refill:  0    levocetirizine (XYZAL) 5 MG tablet     Sig: Take 1 tablet (5 mg total) by mouth every evening.     Dispense:  30 tablet     Refill:  0        No follow-ups on file.      E-Visit Time Tracking:    Day 1 Time (in minutes): 5    Total Time (in minutes): 5

## 2024-04-16 ENCOUNTER — OFFICE VISIT (OUTPATIENT)
Dept: FAMILY MEDICINE | Facility: CLINIC | Age: 59
End: 2024-04-16
Payer: COMMERCIAL

## 2024-04-16 ENCOUNTER — PATIENT MESSAGE (OUTPATIENT)
Dept: FAMILY MEDICINE | Facility: CLINIC | Age: 59
End: 2024-04-16

## 2024-04-16 ENCOUNTER — HOSPITAL ENCOUNTER (OUTPATIENT)
Dept: RADIOLOGY | Facility: HOSPITAL | Age: 59
Discharge: HOME | End: 2024-04-16
Attending: NURSE PRACTITIONER
Payer: COMMERCIAL

## 2024-04-16 VITALS
SYSTOLIC BLOOD PRESSURE: 106 MMHG | DIASTOLIC BLOOD PRESSURE: 76 MMHG | BODY MASS INDEX: 30.3 KG/M2 | HEART RATE: 75 BPM | OXYGEN SATURATION: 97 % | WEIGHT: 177.5 LBS | HEIGHT: 64 IN

## 2024-04-16 DIAGNOSIS — M79.641 BILATERAL HAND PAIN: Primary | ICD-10-CM

## 2024-04-16 DIAGNOSIS — M79.642 BILATERAL HAND PAIN: ICD-10-CM

## 2024-04-16 DIAGNOSIS — M79.642 BILATERAL HAND PAIN: Primary | ICD-10-CM

## 2024-04-16 DIAGNOSIS — Z13.828 SCREENING FOR RHEUMATOID ARTHRITIS: Primary | ICD-10-CM

## 2024-04-16 DIAGNOSIS — M79.641 BILATERAL HAND PAIN: ICD-10-CM

## 2024-04-16 PROCEDURE — 73130 X-RAY EXAM OF HAND: CPT | Mod: TC,50,PN

## 2024-04-16 PROCEDURE — 99999 PR PBB SHADOW E&M-EST. PATIENT-LVL V: CPT | Mod: PBBFAC,,, | Performed by: NURSE PRACTITIONER

## 2024-04-16 PROCEDURE — 73130 X-RAY EXAM OF HAND: CPT | Mod: 26,50,, | Performed by: RADIOLOGY

## 2024-04-16 PROCEDURE — 99213 OFFICE O/P EST LOW 20 MIN: CPT | Mod: S$GLB,,, | Performed by: NURSE PRACTITIONER

## 2024-04-16 RX ORDER — CYCLOBENZAPRINE HCL 10 MG
10 TABLET ORAL NIGHTLY
Qty: 30 TABLET | Refills: 6 | Status: SHIPPED | OUTPATIENT
Start: 2024-04-16

## 2024-04-16 RX ORDER — MELOXICAM 7.5 MG/1
7.5 TABLET ORAL DAILY
Qty: 30 TABLET | Refills: 0 | Status: SHIPPED | OUTPATIENT
Start: 2024-04-16

## 2024-04-16 NOTE — PROGRESS NOTES
Some  osseous demineralization and cystic change noted to left hand- no obvious fracture or dislocation.  Recommend further evaluation for inflammatory arthritic process - will place order for labs- please schedule to have labs drawn..

## 2024-04-16 NOTE — PROGRESS NOTES
THIS DOCUMENT WAS MADE IN PART WITH VOICE RECOGNITION SOFTWARE.  OCCASIONALLY THIS SOFTWARE WILL MISINTERPRET WORDS OR PHRASES.     Assessment and Plan:    Bilateral hand pain  -     cyclobenzaprine (FLEXERIL) 10 MG tablet; Take 1 tablet (10 mg total) by mouth every evening.  Dispense: 30 tablet; Refill: 6  -     X-Ray Hand 3 View Bilateral; Future; Expected date: 04/16/2024  -     meloxicam (MOBIC) 7.5 MG tablet; Take 1 tablet (7.5 mg total) by mouth once daily.  Dispense: 30 tablet; Refill: 0  -     Ambulatory referral/consult to Physical/Occupational Therapy; Future; Expected date: 04/23/2024     X-ray ordered.  Advised on symptomatic treatment and medications.  Recommended PT/OT referral.   Emergent conditions discussed.      Follow up in about 6 months (around 10/16/2024) for Follow-up with PCP.     ______________________________________________________________________  Subjective:    Chief Complaint:  Hand pain    HPI:  Tori is a 58 y.o. year old female here complaining of bilateral hand pain. Describes as aching pains and tightness- states s/s going on for years- gradually worsening. Exacerbated by physical activity.  Wears night splints- takes Ibuprofen as needed- minimal relief. Takes flexeril at night - helps with tension/migraines- requesting refill.             Arthralgias: ?  Fibromyalgia Multiple joints.  Hands bilateral. Left-hand dominant.   thoracic scoliosis.     Heart palpations:  Stable     Vertigo/BPPV: Mgmt neuro Dr Burks.  + tinnitus both ears- meclizine as needed     Headaches/migraines/tension:  Controlled Rx Fiorect , p.r.n. Flexeril     Insect bite reaction:  Possible spiders.  Rx EpiPen.           Medications:  Current Outpatient Medications on File Prior to Visit   Medication Sig Dispense Refill    buPROPion (WELLBUTRIN XL) 150 MG TB24 tablet TAKE 1 TABLET BY MOUTH ONCE  DAILY 90 tablet 0    butalbital-acetaminophen-caffeine -40 mg (FIORICET, ESGIC) -40 mg per tablet One  p.o. x1 headache.  Repeat 2 hr as needed 10 tablet 0    DULoxetine (CYMBALTA) 30 MG capsule Take 1 capsule (30 mg total) by mouth once daily. (Due PCP visit and labs)) 90 capsule 0    finasteride (PROSCAR) 5 mg tablet Take 5 mg by mouth.      levocetirizine (XYZAL) 5 MG tablet Take 1 tablet (5 mg total) by mouth every evening. 30 tablet 0    LORazepam (ATIVAN) 0.5 MG tablet TAKE 1 TABLET BY MOUTH ONCE DAILY AS NEEDED UP TO 5 TIMES WEEKLY (SEDATION PRECAUTIONS) 20 tablet 0    meclizine (ANTIVERT) 25 mg tablet Take 1 tablet (25 mg total) by mouth 3 (three) times daily as needed for Dizziness. 30 tablet 0    minoxidiL (LONITEN) 2.5 MG tablet SMARTSI.25 Tablet(s) By Mouth Daily      spironolactone (ALDACTONE) 100 MG tablet Take 2 tablets (200 mg total) by mouth once daily. 180 tablet 0    [DISCONTINUED] cyclobenzaprine (FLEXERIL) 10 MG tablet Take 1 tablet (10 mg total) by mouth every evening. 30 tablet 6    azelastine (ASTELIN) 137 mcg (0.1 %) nasal spray 1 spray (137 mcg total) by Nasal route 2 (two) times daily. 30 mL 6    cyanocobalamin 1,000 mcg/mL injection Inject 1 mL (1,000 mcg total) into the muscle every 28 days. (Patient not taking: Reported on 10/24/2023) 1 mL 3    EPINEPHrine (EPIPEN) 0.3 mg/0.3 mL AtIn Inject 0.3 mLs (0.3 mg total) into the muscle once. Dispense 1 kit for 1 dose 0.6 mL 0    estradiol-norethindrone (COMBIPATCH) 0.05-0.14 mg/24 hr Place 1 patch onto the skin twice a week. Apply transdermally to the lower abdomen; system is to be worn continuously; replace patch every 3 to 4 days (twice weekly) (Patient not taking: Reported on 2024) 8 patch 11    fluticasone propionate (FLONASE) 50 mcg/actuation nasal spray 1 spray (50 mcg total) by Each Nostril route once daily. (Patient not taking: Reported on 2024) 9.9 mL 0    fluticasone propionate (FLONASE) 50 mcg/actuation nasal spray 1 spray (50 mcg total) by Each Nostril route once daily. (Patient not taking: Reported on 2024) 16 g 0  "   syringe with needle 1 mL 25 gauge x 1" Syrg Inject 1 Syringe as directed every 30 days. Disp 1 box (Patient not taking: Reported on 1/18/2024) 3 each 3    UNABLE TO FIND medication name: C* E2-Testosterone 4 pumps -20mg (Patient not taking: Reported on 4/16/2024)      [DISCONTINUED] topiramate (TOPAMAX) 25 MG tablet Take 25 mg by mouth 2 (two) times daily. (Patient not taking: Reported on 4/16/2024)       No current facility-administered medications on file prior to visit.       Review of Systems:  Review of Systems   Constitutional:  Negative for chills, fatigue and fever.   Respiratory:  Negative for shortness of breath.    Cardiovascular:  Negative for chest pain, palpitations and leg swelling.   Musculoskeletal:  Positive for arthralgias. Negative for joint swelling (bilateral hand).   Neurological:  Positive for headaches.   Psychiatric/Behavioral:  Negative for dysphoric mood and sleep disturbance.    All other systems reviewed and are negative.      Past Medical History:  Past Medical History:   Diagnosis Date    Anxiety and depression     Colon polyps 03/26/2019    Hair loss disorder        Objective:    Vitals:  Vitals:    04/16/24 0909   BP: 106/76   Pulse: 75   SpO2: 97%   Weight: 80.5 kg (177 lb 7.5 oz)   Height: 5' 4" (1.626 m)   PainSc:   2       Physical Exam  Constitutional:       Appearance: Normal appearance. She is obese.   HENT:      Head: Normocephalic and atraumatic.   Eyes:      Extraocular Movements: Extraocular movements intact.      Pupils: Pupils are equal, round, and reactive to light.   Cardiovascular:      Rate and Rhythm: Normal rate.   Pulmonary:      Effort: Pulmonary effort is normal.   Musculoskeletal:         General: Normal range of motion.      Right hand: Normal.      Left hand: Normal.      Right lower leg: No edema.      Left lower leg: No edema.   Skin:     General: Skin is warm and dry.   Neurological:      Mental Status: She is alert and oriented to person, place, and " time.   Psychiatric:         Mood and Affect: Mood normal.         Data:  CMP  Sodium   Date Value Ref Range Status   03/30/2023 142 134 - 144 mmol/L Final     Potassium   Date Value Ref Range Status   03/30/2023 4.7 3.5 - 5.2 mmol/L Final     Chloride   Date Value Ref Range Status   03/30/2023 107 (H) 96 - 106 mmol/L Final     CO2   Date Value Ref Range Status   03/30/2023 23 20 - 29 mmol/L Final     Glucose   Date Value Ref Range Status   03/30/2023 87 70 - 99 mg/dL Final     BUN   Date Value Ref Range Status   03/30/2023 8 6 - 24 mg/dL Final     Creatinine   Date Value Ref Range Status   03/30/2023 0.97 0.57 - 1.00 mg/dL Final     Calcium   Date Value Ref Range Status   03/30/2023 9.4 8.7 - 10.2 mg/dL Final     Albumin   Date Value Ref Range Status   03/30/2023 4.5 3.8 - 4.9 g/dL Final     Total Bilirubin   Date Value Ref Range Status   03/30/2023 0.4 0.0 - 1.2 mg/dL Final     AST   Date Value Ref Range Status   03/30/2023 17 0 - 40 IU/L Final     ALT   Date Value Ref Range Status   03/30/2023 17 0 - 32 IU/L Final     eGFR   Date Value Ref Range Status   03/30/2023 67 >59 mL/min/1.73 Final          Medical history reviewed, Medications reconciled.          Nasima Marin, FNP-C  Family Medicine

## 2024-04-17 ENCOUNTER — TELEPHONE (OUTPATIENT)
Dept: FAMILY MEDICINE | Facility: CLINIC | Age: 59
End: 2024-04-17
Payer: COMMERCIAL

## 2024-04-17 NOTE — TELEPHONE ENCOUNTER
----- Message from ESTHER Guerra sent at 4/16/2024  6:12 PM CDT -----  Some  osseous demineralization and cystic change noted to left hand- no obvious fracture or dislocation.  Recommend further evaluation for inflammatory arthritic process - will place order for labs- please schedule to have labs drawn..

## 2024-04-18 ENCOUNTER — LAB VISIT (OUTPATIENT)
Dept: LAB | Facility: HOSPITAL | Age: 59
End: 2024-04-18
Attending: NURSE PRACTITIONER
Payer: COMMERCIAL

## 2024-04-18 DIAGNOSIS — Z13.828 SCREENING FOR RHEUMATOID ARTHRITIS: ICD-10-CM

## 2024-04-18 DIAGNOSIS — M79.641 BILATERAL HAND PAIN: ICD-10-CM

## 2024-04-18 DIAGNOSIS — M79.642 BILATERAL HAND PAIN: ICD-10-CM

## 2024-04-18 PROCEDURE — 36415 COLL VENOUS BLD VENIPUNCTURE: CPT | Mod: PN | Performed by: NURSE PRACTITIONER

## 2024-04-18 PROCEDURE — 86431 RHEUMATOID FACTOR QUANT: CPT | Performed by: NURSE PRACTITIONER

## 2024-04-18 PROCEDURE — 86140 C-REACTIVE PROTEIN: CPT | Performed by: NURSE PRACTITIONER

## 2024-04-18 PROCEDURE — 85652 RBC SED RATE AUTOMATED: CPT | Performed by: NURSE PRACTITIONER

## 2024-04-19 LAB
CRP SERPL-MCNC: 1.8 MG/L (ref 0–8.2)
ERYTHROCYTE [SEDIMENTATION RATE] IN BLOOD BY PHOTOMETRIC METHOD: 4 MM/HR (ref 0–36)
RHEUMATOID FACT SERPL-ACNC: <13 IU/ML (ref 0–15)

## 2024-04-20 ENCOUNTER — PATIENT MESSAGE (OUTPATIENT)
Dept: FAMILY MEDICINE | Facility: CLINIC | Age: 59
End: 2024-04-20
Payer: COMMERCIAL

## 2024-04-22 ENCOUNTER — CLINICAL SUPPORT (OUTPATIENT)
Dept: REHABILITATION | Facility: HOSPITAL | Age: 59
End: 2024-04-22
Payer: COMMERCIAL

## 2024-04-22 DIAGNOSIS — M79.641 BILATERAL HAND PAIN: ICD-10-CM

## 2024-04-22 DIAGNOSIS — M79.641 PAIN IN BOTH HANDS: Primary | ICD-10-CM

## 2024-04-22 DIAGNOSIS — M79.642 BILATERAL HAND PAIN: ICD-10-CM

## 2024-04-22 DIAGNOSIS — M79.642 PAIN IN BOTH HANDS: Primary | ICD-10-CM

## 2024-04-22 PROCEDURE — 97165 OT EVAL LOW COMPLEX 30 MIN: CPT | Mod: PN

## 2024-04-22 PROCEDURE — 97110 THERAPEUTIC EXERCISES: CPT | Mod: PN

## 2024-04-22 PROCEDURE — 97018 PARAFFIN BATH THERAPY: CPT | Mod: PN

## 2024-04-22 NOTE — PATIENT INSTRUCTIONS
"[facility] - OCCUPATIONAL THERAPY  HOME EXERCISE PROGRAM     Paraffin with moist heat x 10 minutes, morning and night.  Dip hand 7 x.       Complete the following exercises with 10 repetitions each, 3 x/day.       AROM: Isolated MCP Flexion / Extension ("Wave")   Bend only your large, bottom knuckles. Hold 3 seconds. Keep the tips of your fingers straight. Straighten fingers.    AROM: Isolated IPJ Flexion / Extension ("Hook")  Bend only your middle and end knuckles. Hold 3 seconds.   Straighten your fingers.     AROM: MCP and PIP Flexion / Extension ("Straight Fist")  Bend your bottom and middle knuckles, keeping the tips of your fingers straight. Try to touch the pads of your fingers on your palm. Hold 3 seconds. Straighten your fingers.     AROM: Composite Flexion / Extension ("Full Fist")  Bend every joint in your hand into a fist. Hold 3 seconds. Straighten your fingers.     AROM: Composte Extension ("Finger Lifts")  Lift your finger off of the table one at a time. Hold 3 seconds. Relax your finger.    AROM: Abduction / Adduction  With hand flat on table, spread all fingers apart, then bring them together as close as possible.    Copyright © I. All rights reserved.     Therapist: ROBBIN Bowser CHT   "

## 2024-04-22 NOTE — PLAN OF CARE
[facility]  Occupational Therapy Initial Evaluation      Name: Tori Quinn  Clinic Number: [id number]    Therapy Diagnosis:   Encounter Diagnoses   Name Primary?    Bilateral hand pain     Pain in both hands Yes     Physician: Nasima Marin FNP    Physician Orders: Eval and Treat  Medical Diagnosis:   M79.641,M79.642 (ICD-10-CM) - Bilateral hand pain     Surgical Procedure and Date: N/A  Evaluation Date: 4/22/2024  Insurance Authorization Period Expiration: 12/31/2024  Plan of Care Certification Period: 4/22/2024 to 6/3/2024  Date of Return to MD: 10/16/2024  Visit # / Visits authorized: 1 / 1  FOTO: Intake Score/44    Precautions:  Standard    Time In:1:00 pm  Time Out: 2:00 pm  Total Appointment Time (timed & untimed codes): 60 minutes    Subjective      Date of Onset: 2 months    History of Current Condition/Mechanism of Injury: Tori reports: she began pain in both hands 2 years ago but the pain was not constant in her hands.  She states that her hand pain is constant now.    Falls: no    Involved Side: bilateral  Dominant Side: Left    Mechanism of Injury: N/A  Surgical Procedure: N/A  Imaging: X-rays  4/16/2024 Left hand: Osseous demineralization is noted. Some cystic changes noted within the lunate at the radio carpal articulation and within the capitate. Please correlate for possible CPPD or inflammatory arthropathy or degenerative subchondral pitting. 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     Current Functional Status   Home/Living environment: lives with their family    - 1 story home, 0 steps to enter    - DME: N/A      Limitation of Functional Status as follows:   ADLs/IADLs:     - Feeding: Independent    - Bathing: Independent    - Dressing/Grooming: will have occasional increased hand pain with pulling up pants    - Home Management: increased pain with washing dishes, cooking, writing, using computer and cleaning    - Driving: Independent     Leisure: Crafts    Patient's Goals for Therapy: "decrease pain and increase strength in both hands"    Past Medical History/Physical Systems Review:   Tori Quinn  has a past medical history of Anxiety and depression, Colon polyps, and Hair loss disorder.    Tori Quinn  has a past surgical history that includes Tonsillectomy; Tubal ligation; Hernia repair; and Colonoscopy w/ polypectomy (03/26/2019).    Tori has a current medication list which includes the following prescription(s): azelastine, bupropion, butalbital-acetaminophen-caffeine -40 mg, cyanocobalamin, cyclobenzaprine, duloxetine, epinephrine, estradiol-norethindrone, finasteride, fluticasone propionate, fluticasone propionate, levocetirizine, lorazepam, meclizine, meloxicam, minoxidil, spironolactone, syringe with needle, and UNABLE TO FIND.    Review of patient's allergies indicates:  No Known Allergies     Objective      Observation/Appearance:  Skin intact and no edema or atrophy noted    Hand/Wrist AROM:  WNL     Sensation  Median/Ulnar Nerve Distribution 4/22/2024 4/22/2024    Left Right   Superior Denny     Normal 1.65-2.83 x x   Diminished Light Touch 3.22-3.61     Diminished Protective 3.84-4.31     Loss of Protective 4.56-6.65     Untestable >6.65       Special Tests:   Bilateral   4/22/2024   Thumb CMC Grind Test Positive on left   WHAT neg   Phalen's Test neg   Tinel's Test neg "        Strength (Dyanmometer) and Pinch Strength (Pinch Gauge)  Measured in pounds and psi. Average of three trials.   4/22/2024 4/22/2024    Left Right   Rung II 45 36   Key Pinch 9 9   3pt Pinch 9 8   2pt Pinch 4 5         Intake Outcome Measure for FOTO Hand Survey    Therapist reviewed FOTO scores for Tori Quinn on 4/22/2024.   FOTO report - see Media section or FOTO account episode details.    Intake Score: 44%       Treatment      Total Treatment time (time-based codes) separate from Evaluation: 8 minutes    Tori received the treatments listed below:      therapeutic exercises to develop flexibility for 8 minutes including:  AROM as follows: TGEs, finger abd/add and finger ext x 10 reps each    supervised modalities after being cleared for contradictions: Paraffin    hot pack for 10 minutes to bilateral hands.        Patient Education and Home Exercises      Education provided:   -role of OT, goals for OT, scheduling/cancellations, insurance limitations with patient.  -Additional Education provided: paraffin with moist heat, recommended home unit with heated mittens    Written Home Exercises Provided: yes.  Exercises were reviewed and Tori was able to demonstrate them prior to the end of the session.    Tori demonstrated good  understanding of the education provided.     Pt was advised to perform these exercises free of pain, and to stop performing them if pain occurs.    See EMR under Patient Instructions for exercises provided 4/22/2024.    Assessment      Tori Quinn is a 58 y.o. female referred to outpatient occupational therapy and presents with a medical diagnosis of bilateral hand pain.  Instruct with joint protection techniques next session and evaluate patient's splints that she uses at home.    Following medical record review it is determined that pt will benefit from occupational therapy services in order to maximize pain free and/or functional use of bilateral hands.  The following goals were discussed with the patient and patient is in agreement with them as to be addressed in the treatment plan. The patient's rehab potential is Good.     Anticipated barriers to occupational therapy: none    Plan of care discussed with patient: Yes  Patient's spiritual, cultural and educational needs considered and patient is agreeable to the plan of care and goals as stated below:     Medical Necessity is demonstrated by the following  Occupational Profile/History  Co-morbidities and personal factors that may impact the plan of care [x] LOW: Brief chart review  [] MODERATE: Expanded chart review   [] HIGH: Extensive chart review    Moderate / High Support Documentation: N/A     Examination  Performance deficits relating to physical, cognitive or psychosocial skills that result in activity limitations and/or participation restrictions  [x] LOW: addressing 1-3 Performance deficits  [] MODERATE: 3-5 Performance deficits  [] HIGH: 5+ Performance deficits (please support below)    Moderate / High Support Documentation: N/A    Physical:  Muscle Power/Strength  Muscle Endurance  Pain    Cognitive:  No Deficits    Psychosocial:    No Deficits     Treatment Options [x] LOW: Limited options  [] MODERATE: Several options  [] HIGH: Multiple options      Decision Making/ Complexity Score: low       The following goals were discussed with the patient and patient is in agreement with them as to be addressed in the treatment plan.     Goals:   LongTerm (by discharge):  1)   Patient to be IND with HEP and modalities for pain management.  2)   Patient to be IND wiht Orthotic use, wear and care precautions.   3)   Patient to score at 58% or more on FOTO to demonstrate improved perception of functional bilateral hand use.  4)   Pt will identify two joint protection techniques and activity modification techniques to return to prior level of function.    Plan: Pt will bring her splints from home next session for  therapist evaluation.     Plan of Care Certification: 4/22/2024 to 6/3/2024.     Outpatient Occupational Therapy 1 time weekly for 6 weeks to include the following interventions: Paraffin, Modalities for pain management, Therapeutic exercises/activities., Strengthening, Orthotic Fabrication/Fit/Training, Joint Protection, and Activity Modification.    ROBBIN Trejo, CHT

## 2024-04-30 ENCOUNTER — CLINICAL SUPPORT (OUTPATIENT)
Dept: REHABILITATION | Facility: HOSPITAL | Age: 59
End: 2024-04-30
Payer: COMMERCIAL

## 2024-04-30 DIAGNOSIS — M79.641 PAIN IN BOTH HANDS: Primary | ICD-10-CM

## 2024-04-30 DIAGNOSIS — M79.642 PAIN IN BOTH HANDS: Primary | ICD-10-CM

## 2024-04-30 PROCEDURE — 97018 PARAFFIN BATH THERAPY: CPT | Mod: PN

## 2024-04-30 PROCEDURE — 97110 THERAPEUTIC EXERCISES: CPT | Mod: PN

## 2024-04-30 PROCEDURE — 97760 ORTHOTIC MGMT&TRAING 1ST ENC: CPT | Mod: PN

## 2024-04-30 PROCEDURE — 97535 SELF CARE MNGMENT TRAINING: CPT | Mod: PN

## 2024-04-30 PROCEDURE — 97140 MANUAL THERAPY 1/> REGIONS: CPT | Mod: PN

## 2024-04-30 NOTE — PATIENT INSTRUCTIONS
[facility]  OCCUPATIONAL THERAPY  HOME EXERCISE PROGRAM     Complete the following strengthening program 1x/day.                      _                  ROBBIN Bowser, CONNIE  Certified Hand Therapist  Occupational Therapist

## 2024-04-30 NOTE — PROGRESS NOTES
Occupational Therapy Daily Treatment Note     Date: 4/30/2024  Name: Tori Quinn  Clinic Number: [id number]    Therapy Diagnosis:   Encounter Diagnosis   Name Primary?    Pain in both hands Yes     Physician: Nasima Marin FNP    Physician Orders: Eval and Treat  Medical Diagnosis:   M79.641,M79.642 (ICD-10-CM) - Bilateral hand pain      Surgical Procedure and Date: N/A  Evaluation Date: 4/22/2024  Insurance Authorization Period Expiration: 12/31/2024  Plan of Care Certification Period: 4/22/2024 to 6/3/2024  Date of Return to MD: 10/16/2024  Visit # / Visits authorized: 2/20  FOTO: Intake Score/44     Precautions:  Standard     Time In:9:00 am  Time Out: 9:50 am  Total Appointment Time (timed & untimed codes): 50 minutes  Subjective     Pt reports: she was somewhat compliant with home exercise program given last session.   Response to previous treatment:decreased pain in bilateral hands  Functional change: able to mow the lawn    Pain: 1/10  Location: bilateral hands      Previous functional status includes: Independent with all ADLs.      Current Functional Status:   Limitation of Functional Status as follows:              ADLs/IADLs:                           - Feeding: Independent                          - Bathing: Independent                          - Dressing/Grooming: will have occasional increased hand pain with pulling up pants                          - Home Management: increased pain with washing dishes, cooking, writing, using computer and cleaning                          - Driving: Independent                 Leisure: Crafts  Objective      Special Tests:   Bilateral    4/22/2024   Thumb CMC Grind Test Positive on left   WHAT neg   Phalen's Test neg   Tinel's Test neg          Strength (Dyanmometer) and Pinch Strength (Pinch Gauge)  Measured in pounds and psi. Average of three trials.    4/22/2024 4/22/2024     Left Right   Rung II 45 36   Loyd Pinch 9 9   3pt  Pinch 9 8   2pt Pinch 4 5      Tori received the treatments listed below:       supervised modalities after being cleared for contradictions: Paraffin     hot pack for 10 minutes to bilateral hands.       Tori received the following manual therapy techniques for 8 minutes:   -STM to bilateral hands    therapeutic exercises to develop flexibility for 15 minutes including:  AROM as follows: TGEs, finger abd/add and finger ext x 10 reps each  -tan putty: 1' grasping and 2' molding each hand    Orthotic fitting and trainin minutes  - Pt brought in her wrist braces.  Instructed pt on proper fit and application of bilateral braces. Pt verbalized good understanding.    Self care/home management trainin minutes    Reviewed joint protection techniques, energy conservation techniques and activity modification. Pt verbalized good understanding of all materials presented.  Recommended CMC push metagrip and/or Cool comfort for CMC OA on left hand.             Home Exercises and Education Provided     Education provided:   - cont HEP and wear splints at night only, instructed on proper application of splints  -Recommend left CMC metagrip or Cool Comfort for day time activities  -Issued thumb CMC arthritis booklet with activity modification and joint protection techniques  -Recommended built up handle for lawn mower  - Progress towards goals: Excellent    Written Home Exercises Provided: yes.  Exercises were reviewed and Tori was able to demonstrate them prior to the end of the session.  Tori demonstrated good  understanding of the education provided.     See EMR under Patient Instructions for exercises provided 2024.     Tori demonstrated good  understanding of the education provided.         Assessment     Tori Quinn is a 58 y.o. female referred to outpatient occupational therapy and presents with a medical diagnosis of bilateral hand pain. Instructed patient with proper fit of wrist braces  to be worn at night only. Recommended left CMC brace for CMC OA. Reviewed joint protection, activity modification and energy conservation techniques. Pt verbalized good understanding of all materials presented. Issued tan putty and advanced light activities with mild fatigue.     Pt would continue to benefit from skilled OT.      Tori is progressing well towards her goals and there are no updates to goals at this time. Pt prognosis is Good.     Pt will continue to benefit from skilled outpatient occupational therapy to address the deficits listed in the problem list on initial evaluation provide pt/family education and to maximize pt's level of independence in the home and community environment.     Anticipated barriers to occupational therapy: none    Pt's spiritual, cultural and educational needs considered and pt agreeable to plan of care and goals.    Goals:  LongTerm (by discharge):  1)   Patient to be IND with HEP and modalities for pain management.- Met 4/30/2024  2)   Patient to be IND with Orthotic use, wear and care precautions. - Met 4/30/2024  3)   Patient to score at 58% or more on FOTO to demonstrate improved perception of functional bilateral hand use.-progressing  4)   Pt will identify two joint protection techniques and activity modification techniques to return to prior level of function.-progressing       Plan   Plan of Care Certification: 4/22/2024 to 6/3/2024.      Outpatient Occupational Therapy 1 time weekly for 6 weeks to include the following interventions: Paraffin, Modalities for pain management, Therapeutic exercises/activities., Strengthening, Orthotic Fabrication/Fit/Training, Joint Protection, and Activity Modification.     Discussed Plan of Care with patient: Yes  Updates/Grading for next session: Patient to discuss any further joint protection techniques or activity modifications for her activities at home with therapist if needed.      ROBBIN Trejo, CHT

## 2024-05-09 RX ORDER — EPINEPHRINE 0.3 MG/.3ML
1 INJECTION SUBCUTANEOUS ONCE
Qty: 1 EACH | Refills: 0 | Status: SHIPPED | OUTPATIENT
Start: 2024-05-09 | End: 2024-05-09

## 2024-05-09 NOTE — TELEPHONE ENCOUNTER
Please approve for EPINEPHrine (EPIPEN) 0.3 mg/0.3 mL AtIn ()   Last OV 24  Last refill date 22  Last labs 24  Next appt 10/16/24

## 2024-05-30 DIAGNOSIS — F41.9 ANXIETY: ICD-10-CM

## 2024-05-30 RX ORDER — MECLIZINE HYDROCHLORIDE 25 MG/1
25 TABLET ORAL 3 TIMES DAILY PRN
Qty: 30 TABLET | Refills: 0 | Status: SHIPPED | OUTPATIENT
Start: 2024-05-30

## 2024-05-30 RX ORDER — LORAZEPAM 0.5 MG/1
TABLET ORAL
Qty: 10 TABLET | Refills: 0 | Status: SHIPPED | OUTPATIENT
Start: 2024-05-30

## 2024-05-30 NOTE — TELEPHONE ENCOUNTER
Short supply of Ativan.  Sent in meclizine    Patient has not seen me since early 2023 needs visit for future refills.  Move her appointment up from October    Needs fasting labs done before appointment previously ordered

## 2024-05-30 NOTE — TELEPHONE ENCOUNTER
No care due was identified.  [facility] Embedded Care Due Messages. Reference number: [id number].   5/30/2024 2:09:30 PM CDT

## 2024-05-31 DIAGNOSIS — Z00.00 ROUTINE CHECK-UP: Primary | ICD-10-CM

## 2024-05-31 DIAGNOSIS — Z13.1 SCREENING FOR DIABETES MELLITUS: ICD-10-CM

## 2024-05-31 NOTE — TELEPHONE ENCOUNTER
Called pt, I moved pt appointment up to July 24, and put orders in for routine labs and got her scheduled for fasting labs for July 17 th.

## 2024-06-08 DIAGNOSIS — L65.9 HAIR LOSS: ICD-10-CM

## 2024-06-08 NOTE — TELEPHONE ENCOUNTER
No care due was identified.  [facility] Embedded Care Due Messages. Reference number: [id number].   6/08/2024 3:35:35 AM CDT

## 2024-06-08 NOTE — TELEPHONE ENCOUNTER
Refill Routing Note   Medication(s) are not appropriate for processing by [facility] for the following reason(s):        Required labs outdated    [facility] action(s):  Defer             Appointments  past 12m or future 3m with PCP    Date Provider   Last Visit   4/3/2023 Philippe Hahn MD   Next Visit   7/24/2024 Philippe Hahn MD   ED visits in past 90 days: 0        Note composed:5:13 PM 06/08/2024

## 2024-06-12 RX ORDER — SPIRONOLACTONE 100 MG/1
TABLET, FILM COATED ORAL
Qty: 60 TABLET | Refills: 0 | Status: SHIPPED | OUTPATIENT
Start: 2024-06-12

## 2024-07-17 ENCOUNTER — LAB VISIT (OUTPATIENT)
Dept: LAB | Facility: HOSPITAL | Age: 59
End: 2024-07-17
Attending: INTERNAL MEDICINE
Payer: COMMERCIAL

## 2024-07-17 DIAGNOSIS — Z13.1 SCREENING FOR DIABETES MELLITUS: ICD-10-CM

## 2024-07-17 DIAGNOSIS — Z00.00 ROUTINE CHECK-UP: ICD-10-CM

## 2024-07-17 LAB
ALBUMIN SERPL BCP-MCNC: 4 G/DL (ref 3.5–5.2)
ALP SERPL-CCNC: 69 U/L (ref 55–135)
ALT SERPL W/O P-5'-P-CCNC: 24 U/L (ref 10–44)
ANION GAP SERPL CALC-SCNC: 9 MMOL/L (ref 8–16)
AST SERPL-CCNC: 18 U/L (ref 10–40)
BASOPHILS # BLD AUTO: 0.07 K/UL (ref 0–0.2)
BASOPHILS NFR BLD: 0.9 % (ref 0–1.9)
BILIRUB SERPL-MCNC: 0.4 MG/DL (ref 0.1–1)
BUN SERPL-MCNC: 10 MG/DL (ref 6–20)
CALCIUM SERPL-MCNC: 10.5 MG/DL (ref 8.7–10.5)
CHLORIDE SERPL-SCNC: 107 MMOL/L (ref 95–110)
CHOLEST SERPL-MCNC: 204 MG/DL (ref 120–199)
CHOLEST/HDLC SERPL: 3.4 {RATIO} (ref 2–5)
CO2 SERPL-SCNC: 24 MMOL/L (ref 23–29)
CREAT SERPL-MCNC: 1 MG/DL (ref 0.5–1.4)
DIFFERENTIAL METHOD BLD: ABNORMAL
EOSINOPHIL # BLD AUTO: 0.2 K/UL (ref 0–0.5)
EOSINOPHIL NFR BLD: 2.7 % (ref 0–8)
ERYTHROCYTE [DISTWIDTH] IN BLOOD BY AUTOMATED COUNT: 13.1 % (ref 11.5–14.5)
EST. GFR  (NO RACE VARIABLE): >60 ML/MIN/1.73 M^2
ESTIMATED AVG GLUCOSE: 100 MG/DL (ref 68–131)
GLUCOSE SERPL-MCNC: 91 MG/DL (ref 70–110)
HBA1C MFR BLD: 5.1 % (ref 4–5.6)
HCT VFR BLD AUTO: 44.6 % (ref 37–48.5)
HDLC SERPL-MCNC: 60 MG/DL (ref 40–75)
HDLC SERPL: 29.4 % (ref 20–50)
HGB BLD-MCNC: 14 G/DL (ref 12–16)
IMM GRANULOCYTES # BLD AUTO: 0.03 K/UL (ref 0–0.04)
IMM GRANULOCYTES NFR BLD AUTO: 0.4 % (ref 0–0.5)
LDLC SERPL CALC-MCNC: 116 MG/DL (ref 63–159)
LYMPHOCYTES # BLD AUTO: 2.6 K/UL (ref 1–4.8)
LYMPHOCYTES NFR BLD: 35.2 % (ref 18–48)
MCH RBC QN AUTO: 28.3 PG (ref 27–31)
MCHC RBC AUTO-ENTMCNC: 31.4 G/DL (ref 32–36)
MCV RBC AUTO: 90 FL (ref 82–98)
MONOCYTES # BLD AUTO: 0.6 K/UL (ref 0.3–1)
MONOCYTES NFR BLD: 8.3 % (ref 4–15)
NEUTROPHILS # BLD AUTO: 4 K/UL (ref 1.8–7.7)
NEUTROPHILS NFR BLD: 52.5 % (ref 38–73)
NONHDLC SERPL-MCNC: 144 MG/DL
NRBC BLD-RTO: 0 /100 WBC
PLATELET # BLD AUTO: 280 K/UL (ref 150–450)
PMV BLD AUTO: 12.2 FL (ref 9.2–12.9)
POTASSIUM SERPL-SCNC: 4.7 MMOL/L (ref 3.5–5.1)
PROT SERPL-MCNC: 6.8 G/DL (ref 6–8.4)
RBC # BLD AUTO: 4.95 M/UL (ref 4–5.4)
SODIUM SERPL-SCNC: 140 MMOL/L (ref 136–145)
TRIGL SERPL-MCNC: 140 MG/DL (ref 30–150)
TSH SERPL DL<=0.005 MIU/L-ACNC: 1.55 UIU/ML (ref 0.4–4)
WBC # BLD AUTO: 7.51 K/UL (ref 3.9–12.7)

## 2024-07-17 PROCEDURE — 83036 HEMOGLOBIN GLYCOSYLATED A1C: CPT | Performed by: INTERNAL MEDICINE

## 2024-07-17 PROCEDURE — 80053 COMPREHEN METABOLIC PANEL: CPT | Performed by: INTERNAL MEDICINE

## 2024-07-17 PROCEDURE — 84443 ASSAY THYROID STIM HORMONE: CPT | Performed by: INTERNAL MEDICINE

## 2024-07-17 PROCEDURE — 80061 LIPID PANEL: CPT | Performed by: INTERNAL MEDICINE

## 2024-07-17 PROCEDURE — 36415 COLL VENOUS BLD VENIPUNCTURE: CPT | Mod: PN | Performed by: INTERNAL MEDICINE

## 2024-07-17 PROCEDURE — 85025 COMPLETE CBC W/AUTO DIFF WBC: CPT | Performed by: INTERNAL MEDICINE

## 2024-07-24 ENCOUNTER — HOSPITAL ENCOUNTER (OUTPATIENT)
Dept: RADIOLOGY | Facility: HOSPITAL | Age: 59
Discharge: HOME | End: 2024-07-24
Attending: INTERNAL MEDICINE
Payer: COMMERCIAL

## 2024-07-24 ENCOUNTER — OFFICE VISIT (OUTPATIENT)
Dept: FAMILY MEDICINE | Facility: CLINIC | Age: 59
End: 2024-07-24
Payer: COMMERCIAL

## 2024-07-24 VITALS
DIASTOLIC BLOOD PRESSURE: 84 MMHG | RESPIRATION RATE: 18 BRPM | HEIGHT: 64 IN | HEART RATE: 73 BPM | BODY MASS INDEX: 29.94 KG/M2 | SYSTOLIC BLOOD PRESSURE: 104 MMHG | WEIGHT: 175.38 LBS

## 2024-07-24 DIAGNOSIS — F32.A DEPRESSION, UNSPECIFIED DEPRESSION TYPE: ICD-10-CM

## 2024-07-24 DIAGNOSIS — E66.9 OBESITY (BMI 30-39.9): ICD-10-CM

## 2024-07-24 DIAGNOSIS — M22.2X1 PATELLOFEMORAL SYNDROME OF BOTH KNEES: ICD-10-CM

## 2024-07-24 DIAGNOSIS — G89.29 CHRONIC PAIN OF BOTH KNEES: ICD-10-CM

## 2024-07-24 DIAGNOSIS — M25.561 CHRONIC PAIN OF BOTH KNEES: ICD-10-CM

## 2024-07-24 DIAGNOSIS — F41.0 GENERALIZED ANXIETY DISORDER WITH PANIC ATTACKS: ICD-10-CM

## 2024-07-24 DIAGNOSIS — M22.2X2 PATELLOFEMORAL SYNDROME OF BOTH KNEES: ICD-10-CM

## 2024-07-24 DIAGNOSIS — M25.562 CHRONIC PAIN OF BOTH KNEES: ICD-10-CM

## 2024-07-24 DIAGNOSIS — F41.1 GENERALIZED ANXIETY DISORDER WITH PANIC ATTACKS: ICD-10-CM

## 2024-07-24 DIAGNOSIS — Z00.00 WELLNESS EXAMINATION: Primary | ICD-10-CM

## 2024-07-24 DIAGNOSIS — D36.9 TUBULAR ADENOMA: ICD-10-CM

## 2024-07-24 PROCEDURE — 99999 PR PBB SHADOW E&M-EST. PATIENT-LVL V: CPT | Mod: PBBFAC,,, | Performed by: INTERNAL MEDICINE

## 2024-07-24 PROCEDURE — 73562 X-RAY EXAM OF KNEE 3: CPT | Mod: 26,50,, | Performed by: RADIOLOGY

## 2024-07-24 PROCEDURE — 99396 PREV VISIT EST AGE 40-64: CPT | Mod: S$GLB,,, | Performed by: INTERNAL MEDICINE

## 2024-07-24 PROCEDURE — 73562 X-RAY EXAM OF KNEE 3: CPT | Mod: TC,50,PN

## 2024-07-24 RX ORDER — HYDROXYZINE PAMOATE 25 MG/1
25 CAPSULE ORAL EVERY 8 HOURS PRN
Qty: 30 CAPSULE | Refills: 0 | Status: SHIPPED | OUTPATIENT
Start: 2024-07-24

## 2024-07-24 RX ORDER — DULOXETIN HYDROCHLORIDE 30 MG/1
30 CAPSULE, DELAYED RELEASE ORAL 2 TIMES DAILY
Qty: 180 CAPSULE | Refills: 2 | Status: SHIPPED | OUTPATIENT
Start: 2024-07-24 | End: 2024-07-24

## 2024-07-24 RX ORDER — DULOXETIN HYDROCHLORIDE 30 MG/1
30 CAPSULE, DELAYED RELEASE ORAL 2 TIMES DAILY
Qty: 180 CAPSULE | Refills: 2 | Status: SHIPPED | OUTPATIENT
Start: 2024-07-24

## 2024-07-24 NOTE — PROGRESS NOTES
Subjective:       Patient ID: Tori Quinn is a 58 y.o. female.    Chief Complaint: Annual Exam (Patient is here for a wellness visit)   HPI    Gyn: Dr Long   MM2021 overdue   Dexa:  2021 normal    Colonoscopy:   3/2019 Dr Bermeo  tub adenoma r/c 5   Immunizations: Flu: no  Tdap:   Shingles: no    Smoker: no   Get old note       wellness   Labs normal        After fall in May B Knee soreness increase post and now w playing [place] with daughter.      Mother day weekend tripped over curb at store. Seen at ER mild concussion CT head normal no xray knees done. never seen orthopedic       Depression: still stressed.  Seeing Counsler weekly. Mostly autistic daughter and family stress.    Rx Wellbutrin 150, Cymbalta 30   Prev Prozac 30 long-time stopped working. //MIKIE living w them, daughter autistic.         Arthralgias multi jts/Fibromyalgia: improve w Cymbalta.  Hands bilateral. Left-hand dominant. DDD, mild thoracic scoliosis.     Heart palpations:  Stable     Vertigo/BPPV: + tinnitus both ears  MRI Mgmt neuro Dr Burks.       Headaches/migraines/tension:  Controlled Rx prn Fiorect , Flexeril     Insect bite reaction:  Possible spiders.  Rx EpiPen.      Met Syndrome: BMI 30/17 175 down from 185      ____________________________________________________________________________________________________  Assessment & Plan:  1. Wellness examination    2. Patellofemoral syndrome of both knees  - Ambulatory referral/consult to Physical/Occupational Therapy; Future    3. Chronic pain of both knees    4. Depression, unspecified depression type  - DULoxetine (CYMBALTA) 30 MG capsule; Take 1 capsule (30 mg total) by mouth 2 (two) times daily.  Dispense: 180 capsule; Refill: 2    5. Generalized anxiety disorder with panic attacks  - hydrOXYzine pamoate (VISTARIL) 25 MG Cap; Take 1 capsule (25 mg total) by mouth every 8 (eight) hours as needed (prn anxiety).  Dispense: 30 capsule; Refill: 0  - DULoxetine  (CYMBALTA) 30 MG capsule; Take 1 capsule (30 mg total) by mouth 2 (two) times daily.  Dispense: 180 capsule; Refill: 2    6. Tubular adenoma  - Ambulatory referral/consult to Gastroenterology; Future    7. Obesity (BMI 30-39.9)     Wellness examination    Patellofemoral syndrome of both knees  -     Ambulatory referral/consult to Physical/Occupational Therapy; Future; Expected date: 07/31/2024    Chronic pain of both knees  -     Cancel: X-Ray Knee 1 or 2 View Bilateral; Future; Expected date: 07/24/2024    Depression, unspecified depression type  -     Discontinue: DULoxetine (CYMBALTA) 30 MG capsule; Take 1 capsule (30 mg total) by mouth 2 (two) times daily.  Dispense: 180 capsule; Refill: 2  -     DULoxetine (CYMBALTA) 30 MG capsule; Take 1 capsule (30 mg total) by mouth 2 (two) times daily.  Dispense: 180 capsule; Refill: 2    Generalized anxiety disorder with panic attacks  -     Discontinue: DULoxetine (CYMBALTA) 30 MG capsule; Take 1 capsule (30 mg total) by mouth 2 (two) times daily.  Dispense: 180 capsule; Refill: 2  -     hydrOXYzine pamoate (VISTARIL) 25 MG Cap; Take 1 capsule (25 mg total) by mouth every 8 (eight) hours as needed (prn anxiety).  Dispense: 30 capsule; Refill: 0  -     DULoxetine (CYMBALTA) 30 MG capsule; Take 1 capsule (30 mg total) by mouth 2 (two) times daily.  Dispense: 180 capsule; Refill: 2    Tubular adenoma  -     Ambulatory referral/consult to Gastroenterology; Future; Expected date: 07/31/2024    Obesity (BMI 30-39.9)        Continue to work on regular exercise, maintain healthy weight, balanced diet. Avoid unhealthy habits: smoking, excessive alcohol intake.     Disclaimer: This note was partly generated using dictation software which may occasionally result in transcription errors  ____________________________________________________________________________________________________  Review of Systems:  Review of Systems   Constitutional:  Negative for appetite change.   HENT:   "Negative for nosebleeds.    Eyes:  Negative for pain.   Respiratory:  Negative for choking.    Cardiovascular:  Negative for chest pain.   Gastrointestinal:  Negative for blood in stool.   Genitourinary:  Negative for hematuria.   Musculoskeletal:  Positive for arthralgias and myalgias. Negative for joint swelling.   Skin:  Negative for pallor.   Neurological:  Negative for facial asymmetry.   Hematological:  Does not bruise/bleed easily.   Psychiatric/Behavioral:  Positive for dysphoric mood. Negative for confusion.        Review of Systems   Negative or     Objective:     Wt Readings from Last 3 Encounters:   07/24/24 79.5 kg (175 lb 6 oz)   04/16/24 80.5 kg (177 lb 7.5 oz)   01/18/24 80.6 kg (177 lb 11.1 oz)     BP Readings from Last 3 Encounters:   07/24/24 104/84   04/16/24 106/76   01/18/24 108/70       Lab Results   Component Value Date    WBC 7.51 07/17/2024    HGB 14.0 07/17/2024    HCT 44.6 07/17/2024     07/17/2024     07/17/2024    K 4.7 07/17/2024     07/17/2024    ALT 24 07/17/2024    AST 18 07/17/2024    CO2 24 07/17/2024    CREATININE 1.0 07/17/2024    BUN 10 07/17/2024    GLU 91 07/17/2024      Hemoglobin A1C   Date Value Ref Range Status   07/17/2024 5.1 4.0 - 5.6 % Final     Comment:     ADA Screening Guidelines:  5.7-6.4%  Consistent with prediabetes  >or=6.5%  Consistent with diabetes    High levels of fetal hemoglobin interfere with the HbA1C  assay. Heterozygous hemoglobin variants (HbS, HgC, etc)do  not significantly interfere with this assay.   However, presence of multiple variants may affect accuracy.        Lab Results   Component Value Date    TSH 1.547 07/17/2024    TSH 2.050 03/30/2023    TSH 1.920 05/19/2022     No results found for: "FREET4"  Lab Results   Component Value Date    LDLCALC 116.0 07/17/2024    LDLCALC 135 (H) 03/30/2023    LDLCALC 127 (H) 05/19/2022     Lab Results   Component Value Date    TRIG 140 07/17/2024    TRIG 171 (H) 03/30/2023    TRIG 142 " 05/19/2022            Physical Exam  Constitutional:       Appearance: Normal appearance.   HENT:      Head: Normocephalic and atraumatic.   Eyes:      Extraocular Movements: Extraocular movements intact.      Pupils: Pupils are equal, round, and reactive to light.   Cardiovascular:      Rate and Rhythm: Normal rate and regular rhythm.   Pulmonary:      Effort: Pulmonary effort is normal.      Breath sounds: Normal breath sounds.   Musculoskeletal:      Right knee: Crepitus present. No swelling. Tenderness present over the medial joint line.      Left knee: Crepitus present. No swelling. Tenderness present over the medial joint line and lateral joint line.        Legs:       Comments: Soreness to palpation area above and knee crepitus.  Normal range of motion   Neurological:      Mental Status: She is alert and oriented to person, place, and time.   Psychiatric:         Mood and Affect: Mood normal.         Physical Exam  Constitutional:       Appearance: Normal appearance.   HENT:      Head: Normocephalic and atraumatic.   Eyes:      Extraocular Movements: Extraocular movements intact.      Pupils: Pupils are equal, round, and reactive to light.   Cardiovascular:      Rate and Rhythm: Normal rate.   Pulmonary:      Effort: Pulmonary effort is normal.   Neurological:      Mental Status: She is alert and oriented to person, place, and time.   Psychiatric:         Mood and Affect: Mood normal.   Medication List with Changes/Refills   New Medications    HYDROXYZINE PAMOATE (VISTARIL) 25 MG CAP    Take 1 capsule (25 mg total) by mouth every 8 (eight) hours as needed (prn anxiety).   Current Medications    AZELASTINE (ASTELIN) 137 MCG (0.1 %) NASAL SPRAY    1 spray (137 mcg total) by Nasal route 2 (two) times daily.    BUPROPION (WELLBUTRIN XL) 150 MG TB24 TABLET    TAKE 1 TABLET BY MOUTH ONCE  DAILY    BUTALBITAL-ACETAMINOPHEN-CAFFEINE -40 MG (FIORICET, ESGIC) -40 MG PER TABLET    One p.o. x1 headache.   "Repeat 2 hr as needed    CYCLOBENZAPRINE (FLEXERIL) 10 MG TABLET    Take 1 tablet (10 mg total) by mouth every evening.    EPINEPHRINE (EPIPEN) 0.3 MG/0.3 ML ATIN    Inject 0.3 mLs (0.3 mg total) into the muscle once. Dispense 1 kit for 1 dose    FINASTERIDE (PROSCAR) 5 MG TABLET    Take 5 mg by mouth.    LEVOCETIRIZINE (XYZAL) 5 MG TABLET    Take 1 tablet (5 mg total) by mouth every evening.    LORAZEPAM (ATIVAN) 0.5 MG TABLET    TAKE 1 TABLET BY MOUTH ONCE DAILY AS NEEDED UP TO 5 TIMES WEEKLY (SEDATION PRECAUTIONS)    MECLIZINE (ANTIVERT) 25 MG TABLET    Take 1 tablet (25 mg total) by mouth 3 (three) times daily as needed for Dizziness.    MINOXIDIL (LONITEN) 2.5 MG TABLET    SMARTSI.25 Tablet(s) By Mouth Daily    SPIRONOLACTONE (ALDACTONE) 100 MG TABLET    TAKE 2 TABLETS BY MOUTH ONCE  DAILY   Changed and/or Refilled Medications    Modified Medication Previous Medication    DULOXETINE (CYMBALTA) 30 MG CAPSULE DULoxetine (CYMBALTA) 30 MG capsule       Take 1 capsule (30 mg total) by mouth 2 (two) times daily.    Take 1 capsule (30 mg total) by mouth once daily. (Due PCP visit and labs))   Discontinued Medications    CYANOCOBALAMIN 1,000 MCG/ML INJECTION    Inject 1 mL (1,000 mcg total) into the muscle every 28 days.    ESTRADIOL-NORETHINDRONE (COMBIPATCH) 0.05-0.14 MG/24 HR    Place 1 patch onto the skin twice a week. Apply transdermally to the lower abdomen; system is to be worn continuously; replace patch every 3 to 4 days (twice weekly)    FLUTICASONE PROPIONATE (FLONASE) 50 MCG/ACTUATION NASAL SPRAY    1 spray (50 mcg total) by Each Nostril route once daily.    FLUTICASONE PROPIONATE (FLONASE) 50 MCG/ACTUATION NASAL SPRAY    1 spray (50 mcg total) by Each Nostril route once daily.    MELOXICAM (MOBIC) 7.5 MG TABLET    Take 1 tablet (7.5 mg total) by mouth once daily.    SYRINGE WITH NEEDLE 1 ML 25 GAUGE X 1" SYRG    Inject 1 Syringe as directed every 30 days. Disp 1 box    UNABLE TO FIND    medication name: " C* E2-Testosterone 4 pumps -20mg

## 2024-07-27 ENCOUNTER — PATIENT MESSAGE (OUTPATIENT)
Dept: FAMILY MEDICINE | Facility: CLINIC | Age: 59
End: 2024-07-27
Payer: COMMERCIAL

## 2024-07-27 DIAGNOSIS — M25.519 SHOULDER PAIN, UNSPECIFIED CHRONICITY, UNSPECIFIED LATERALITY: ICD-10-CM

## 2024-07-27 DIAGNOSIS — M54.2 NECK PAIN, MUSCULOSKELETAL: Primary | ICD-10-CM

## 2024-07-27 DIAGNOSIS — S86.911S STRAIN OF RIGHT KNEE, SEQUELA: ICD-10-CM

## 2024-07-30 NOTE — TELEPHONE ENCOUNTER
We can have her see PT for all 3 areas.    Add new order w neck and shoulder pain.       If need doesn't get better then I would have her see Ortho MD for isabel

## 2024-08-02 ENCOUNTER — CLINICAL SUPPORT (OUTPATIENT)
Dept: REHABILITATION | Facility: HOSPITAL | Age: 59
End: 2024-08-02
Payer: COMMERCIAL

## 2024-08-02 DIAGNOSIS — M25.612 DECREASED RANGE OF MOTION OF LEFT SHOULDER: Primary | ICD-10-CM

## 2024-08-02 DIAGNOSIS — M54.2 NECK PAIN, MUSCULOSKELETAL: ICD-10-CM

## 2024-08-02 DIAGNOSIS — M25.519 SHOULDER PAIN, UNSPECIFIED CHRONICITY, UNSPECIFIED LATERALITY: ICD-10-CM

## 2024-08-02 DIAGNOSIS — M25.561 ACUTE PAIN OF RIGHT KNEE: ICD-10-CM

## 2024-08-02 PROCEDURE — 97162 PT EVAL MOD COMPLEX 30 MIN: CPT | Mod: PO | Performed by: PHYSICAL THERAPIST

## 2024-08-02 PROCEDURE — 97112 NEUROMUSCULAR REEDUCATION: CPT | Mod: PO | Performed by: PHYSICAL THERAPIST

## 2024-08-02 NOTE — PLAN OF CARE
[facility]   Physical Therapy Initial Evaluation      Name: Tori Quinn  Clinic Number: [id number]    Therapy Diagnosis:   Encounter Diagnoses   Name Primary?    Neck pain, musculoskeletal     Shoulder pain, unspecified chronicity, unspecified laterality     Decreased range of motion of left shoulder Yes    Acute pain of right knee         Physician: Philippe Hahn*  Physician Orders: PT Eval and Treat   Post Surgical?No Eval and TreatYes Type of TherapyOutpatient Therapy Location:Neck   Shoulder   Knees  Medical Diagnosis from Referral: M54.2 (ICD-10-CM) - Neck pain, musculoskeletal M25.519 (ICD-10-CM) - Shoulder pain, unspecified chronicity, unspecified laterality  Evaluation Date: 8/2/2024  Authorization Period Expiration: 12/31/2024  Plan of Care Expiration: 9/27/2024  Progress Note Due: 9/1/2024  Date of Surgery: na  Visit # / Visits authorized: 1/ 1   FOTO: 1/ 3 (8/2/2024)    Precautions: Standard     Time In: 907  Time Out: 945  Total Billable Time: 38 minutes    Subjective     Date of onset: a few yrs    History of current condition - Tori reports: a few yrs of neck pain about the SCM and UPPER TRAP as she points. She is Left hand dominant. She also has bilateral knee pain. She has begun playing Badminton and Pickleball since May. The knee pain began a few weeks ago.    Falls: tripped over curb in May.    Imaging: X-ray Knee Ortho Bilateral  Order: [id number]  Status: Final result       Visible to patient: Yes (seen)       Next appt: None       Dx: Chronic pain of both knees    0 Result Notes       1 Patient Communication  Details    Reading Physician Reading Date Result Priority   Miki Ortiz MD  [phone]  [phone] 7/24/2024 Routine     Narrative & Impression  EXAMINATION:  XR KNEE ORTHO BILAT     CLINICAL HISTORY:  Pain in right knee     TECHNIQUE:  AP standing, lateral and Merchant views of both knees were performed.     COMPARISON:  None      FINDINGS:  No fracture, dislocation, or joint effusion.  Mild scattered degenerative changes.  Soft tissues unremarkable.     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     Medical History:   Past Medical History:   Diagnosis Date    Anxiety and depression     Colon polyps 03/26/2019    Hair loss disorder      Surgical History:   Tori Quinn  has a past surgical history that includes Tonsillectomy; Tubal ligation; Hernia repair; and Colonoscopy w/ polypectomy (03/26/2019).    Medications:   Tori has a current medication list which includes the following prescription(s): azelastine, bupropion, butalbital-acetaminophen-caffeine -40 mg, cyclobenzaprine, duloxetine, epinephrine, finasteride, hydroxyzine pamoate, levocetirizine, lorazepam, meclizine, minoxidil, and spironolactone.    Allergies:   Review of patient's allergies indicates:  No Known Allergies     Objective      Observation: Pt is walking with normal gait pattern. FHP, rounded shoulders.    Posture: impaired    Cervical spine AROM:  Flexion: full + Left UPPER TRAP  Extension: 40 deg + Left SCM  Left lat flexion: 24 deg  Right lat flexion: 24 deg + Left UT    Shoulder A/PROM R/L:  Flexion: 175/180; 160/170 + L  Extension: 60/NT; 60/NT + L  Abduction: 170/180; 165/170 + L  Overhead reach: WNL bilateral + pn L  Underhand Reach: WNL bilateral + pn Left    Neg lift off  Left, pos Empty can, neg drop arm and Cortez Keanu    Shoulder strength right/Left :  Flexion: 4+/5; 4-/5  Abduction: 4+/5; 4/5  EXTERNAL ROTATION: 4+/5; 4-/5  INTERNAL ROTATION: 4+/5; 4+/5    Range of Motion:   Knee Left active Right Active   Flexion wnl wnl   Extension wnl wnl     Lower Extremity Strength  Right LE  Left LE    Knee extension: 4+/5 Knee extension: 4+/5   Knee flexion: 4/5 Knee flexion: 4/5   Hip flexion: 4/5 Hip flexion: 4/5   Hip extension:  nt Hip extension: nt   Hip abduction: 4/5 Hip abduction: 4/5   Hip adduction: 4/5 Hip adduction 4/5   Ankle dorsiflexion: 5/5 Ankle dorsiflexion: 5/5   Ankle plantarflexion: 5/5 Ankle plantarflexion: 5/5     Special Tests:   Left Right   Valgus Stress Test - -   Varus Stress test - -   Jameson's Test - -     Palpation: pos Left UPPER TRAP, SCM prox and distal, + right>Left ITB    Intake Outcome Measure for FOTO NECK Survey    Therapist reviewed FOTO scores for Tori Quinn on 8/2/2024.   FOTO report - see Media section or FOTO account episode details.    Intake Score: 40  Goal: 53         Treatment     Total Treatment time (time-based codes) separate from Evaluation: 15 minutes     Tori received the treatments listed below:      neuromuscular re-education activities to improve: Sense, Proprioception, and Posture for 15 minutes. The following activities were included:  Scapular retraction  UPPER TRAP stretch  No money  LONG ARC QUAD  Bridge  SLR  QS  DOUBLE KNEE TO CHEST  ITB stretch  HS stretch  AAROM flexion, Abduction, external rotation with dowel    Patient Education and Home Exercises     Education provided:   - HEP  - Pt/family was provided educational information, including: role of PT, role of pt/caregiver, goals for PT, POC, scheduling, and attendance policy.- pt verbalized understanding.     Written Home Exercises Provided: Yes. Exercises were reviewed and Tori was able to demonstrate them prior to the end of the session.  Tori  demonstrated good  understanding of the education provided. See EMR under Patient Instructions for exercises provided during therapy sessions.    Assessment     Tori is a 58 y.o. female referred to outpatient Physical Therapy with a medical diagnosis of neck pain. Patient presents with impaired Left shoulder ROM, neck pain, tight SCM, UPPER TRAP, impaired LE strength and flexibility, and impaired shoulder strength. Pt enjoys playing Badminton and Pickleball in her spare time, but could use help with appropriate stretching. Her neck pain is the same and right knee pain is improving.    Patient prognosis is Good.   Patient will benefit from skilled outpatient Physical Therapy to address the deficits stated above and in the chart below, provide patient /family education, and to maximize patientt's level of independence.     Plan of care discussed with patient: Yes  Patient's spiritual, cultural and educational needs considered and patient is agreeable to the plan of care and goals as stated below:     Anticipated Barriers for therapy: multiple joints, age    Medical Necessity is demonstrated by the following  History  Co-morbidities and personal factors that may impact the plan of care [] LOW: no personal factors / co-morbidities  [] MODERATE: 1-2 personal factors / co-morbidities  [x] HIGH: 3+ personal factors / co-morbidities    Moderate / High Support Documentation:   Co-morbidities affecting plan of care:   Past Medical History:   Diagnosis Date    Anxiety and depression     Colon polyps 03/26/2019    Hair loss disorder      Personal Factors:   age     Examination  Body Structures and Functions, activity limitations and participation restrictions that may impact the plan of care [] LOW: addressing 1-2 elements  [] MODERATE: 3+ elements  [] HIGH: 4+ elements (please support below)    Moderate / High Support Documentation: Patient presents with impaired Left shoulder ROM, neck pain, tight SCM, UPPER TRAP, impaired  LE strength and flexibility, and impaired shoulder strength.     Clinical Presentation [] LOW: stable  [x] MODERATE: Evolving  [] HIGH: Unstable     Decision Making/ Complexity Score: moderate       Goals:  Short Term Goals: 4 weeks   -Pt will demo full Left shoulder AROM with dec pn.  -Pt will demo inc cervical spine extension by 5 deg from improving flexibility.  -Pt will demo inc cervical spine lateral flexion bilateral by 5 deg for improving flexibility.  -Pr will report 3/10 neck pain for improving condition.    Long Term Goals: 8 weeks   -FOTO score improved to 53 for improving QOL.  -Inc Left shoulder strength grossly equal to right.    Plan     Plan of care Certification: 8/2/2024 to 9/27/2024.    Outpatient Physical Therapy 2 times weekly for 8 weeks to include the following interventions: Aquatic Therapy, Cervical/Lumbar Traction, Electrical Stimulation IFC, Manual Therapy, Moist Heat/ Ice, Neuromuscular Re-ed, Patient Education, Therapeutic Activities, Therapeutic Exercise, Ultrasound, and dry needling.     Sofy Victor PT        Physician's Signature: _________________________________________ Date: ________________

## 2024-08-05 ENCOUNTER — CLINICAL SUPPORT (OUTPATIENT)
Dept: REHABILITATION | Facility: HOSPITAL | Age: 59
End: 2024-08-05
Payer: COMMERCIAL

## 2024-08-05 ENCOUNTER — PATIENT MESSAGE (OUTPATIENT)
Dept: REHABILITATION | Facility: HOSPITAL | Age: 59
End: 2024-08-05

## 2024-08-05 DIAGNOSIS — M25.561 ACUTE PAIN OF RIGHT KNEE: ICD-10-CM

## 2024-08-05 DIAGNOSIS — M25.612 DECREASED RANGE OF MOTION OF LEFT SHOULDER: Primary | ICD-10-CM

## 2024-08-05 PROCEDURE — 97112 NEUROMUSCULAR REEDUCATION: CPT | Mod: PO,CQ

## 2024-08-05 PROCEDURE — 97110 THERAPEUTIC EXERCISES: CPT | Mod: PO,CQ

## 2024-08-07 ENCOUNTER — CLINICAL SUPPORT (OUTPATIENT)
Dept: REHABILITATION | Facility: HOSPITAL | Age: 59
End: 2024-08-07
Payer: COMMERCIAL

## 2024-08-07 DIAGNOSIS — M25.612 DECREASED RANGE OF MOTION OF LEFT SHOULDER: Primary | ICD-10-CM

## 2024-08-07 DIAGNOSIS — M25.561 ACUTE PAIN OF RIGHT KNEE: ICD-10-CM

## 2024-08-07 PROCEDURE — 97140 MANUAL THERAPY 1/> REGIONS: CPT | Mod: PO | Performed by: PHYSICAL THERAPIST

## 2024-08-07 PROCEDURE — 97112 NEUROMUSCULAR REEDUCATION: CPT | Mod: PO | Performed by: PHYSICAL THERAPIST

## 2024-08-12 ENCOUNTER — CLINICAL SUPPORT (OUTPATIENT)
Dept: REHABILITATION | Facility: HOSPITAL | Age: 59
End: 2024-08-12
Payer: COMMERCIAL

## 2024-08-12 DIAGNOSIS — M25.561 ACUTE PAIN OF RIGHT KNEE: ICD-10-CM

## 2024-08-12 DIAGNOSIS — M25.612 DECREASED RANGE OF MOTION OF LEFT SHOULDER: Primary | ICD-10-CM

## 2024-08-12 PROCEDURE — 97112 NEUROMUSCULAR REEDUCATION: CPT | Mod: PO,CQ

## 2024-08-12 PROCEDURE — 97110 THERAPEUTIC EXERCISES: CPT | Mod: PO,CQ

## 2024-08-12 PROCEDURE — 97140 MANUAL THERAPY 1/> REGIONS: CPT | Mod: PO,CQ

## 2024-08-12 NOTE — PROGRESS NOTES
"Physical Therapy Daily Treatment Note     Name: Tori Quinn  Clinic Number: [id number]    Therapy Diagnosis:   Encounter Diagnoses   Name Primary?    Decreased range of motion of left shoulder Yes    Acute pain of right knee      Physician: Philippe Hahn*    Visit Date: 8/12/2024    Physician Orders: PT Eval and Treat   Post Surgical?No Eval and TreatYes Type of TherapyOutpatient Therapy Location:Neck   Shoulder   Knees  Medical Diagnosis from Referral: M54.2 (ICD-10-CM) - Neck pain, musculoskeletal M25.519 (ICD-10-CM) - Shoulder pain, unspecified chronicity, unspecified laterality  Evaluation Date: 8/2/2024  Authorization Period Expiration: 12/31/2024  Plan of Care Expiration: 9/27/2024  Progress Note Due: 9/1/2024  Date of Surgery: na  Visit # / Visits authorized: 3/ 20  FOTO: 1/ 3 (8/2/2024)    Time In: 1:00 pm  Time Out: 2:00 pm  Total Billable Time: 55 minutes    Precautions: Standard    Subjective     Pt reports: no bilateral knee pain currently and states she has been keeping up with LE HEP exercises. She returned with slight increase in cervical pain with HA symptoms upon arrival for treatment.    She was somewhat compliant with home exercise program.  Response to previous treatment: No adverse effects  Functional change: TBD    Pain: 0/10 knees, 4/10 neck  Location:  bilateral knee , Left shoulder      Objective     Objective measures displayed on progress notes unless otherwise noted       Treatment      Tori received therapeutic exercises to develop strength, endurance, ROM, flexibility, posture and core stabilization for 20 minutes including:    UBE level 1 x 6 minutes (forward/backward)  UT stretch 3 x 30" ea  SCM stretch 3 x 30"  Levator Scapulae stretch 3 x 30"  Cervical Snags (ext/rotational) x 15 ea    HEP only:  Recumbent bike x 8 minute  LTR x 20 3" hold  DKTC x 20   Hamstring stretch 2 x 30" ea  IT Band stretch  2 x 30" ea  Adductor stretch 2 x 30" ea    Manual therapy x 10 " "mins STM to SCM and UPPER TRAP in supine, suboccipital distraction biased Left.    Tori participated in neuromuscular re-education activities to improve: Balance, Coordination, Proprioception and Posture for 25 minutes. The following activities were included:    Supine chin tuck 2 x 10  Supine Scapular retraction + depression 2 x 10    0#:  Supine wand flexion 2 x 10, 3"  Supine protraction 2 x 10 with wand  Supine wand press up 2 x 10    Chin tucks w/OP 2 x 10  Scap retractions x 30  NO money RTB 2 x 10      HEP only:  Quad sets x 30 ea  Bridges 3 x 10  BKFO RTB x 20 ea  SLR 2 x 10 ea  LAQ 2 x 10 ea    Home Exercises Provided and Patient Education Provided     Education provided:   - HEP review    Written Home Exercises Provided: Patient instructed to cont prior HEP.  Exercises were reviewed and Tori was able to demonstrate them prior to the end of the session.  Tori demonstrated good  understanding of the education provided.     See EMR under Patient Instructions for exercises provided prior visit.    Assessment   Tori returned reporting slight increase in cervical pain with HA symptoms upon arrival. Treatment began with suboccipital release and STM to L UT and suboccipitals. Pt noted decreased muscular stiffness and decreased HA symptoms following manual therapy. Cervical/postural mobility and strengthening exercises continued with the addition of cervical snags for added flexibility in ext and rotational ranges. Will continue to progress per pt's tolerance.    Tori Is progressing well towards her goals.   Pt prognosis is Good.     Pt will continue to benefit from skilled outpatient physical therapy to address the deficits listed in the problem list box on initial evaluation, provide pt/family education and to maximize pt's level of independence in the home and community environment.     Pt's spiritual, cultural and educational needs considered and pt agreeable to plan of care and goals.   "   Anticipated barriers to physical therapy: multiple joints, age     Goals:   Short Term Goals: 4 weeks   -Pt will demo full Left shoulder AROM with dec pn.  -Pt will demo inc cervical spine extension by 5 deg from improving flexibility.  -Pt will demo inc cervical spine lateral flexion bilateral by 5 deg for improving flexibility.  -Pr will report 3/10 neck pain for improving condition.     Long Term Goals: 8 weeks   -FOTO score improved to 53 for improving QOL.  -Inc Left shoulder strength grossly equal to right.     Plan      Plan of care Certification: 8/2/2024 to 9/27/2024.     Outpatient Physical Therapy 2 times weekly for 8 weeks to include the following interventions: Aquatic Therapy, Cervical/Lumbar Traction, Electrical Stimulation IFC, Manual Therapy, Moist Heat/ Ice, Neuromuscular Re-ed, Patient Education, Therapeutic Activities, Therapeutic Exercise, Ultrasound, and dry needling.     Abhilash Adam, PTA

## 2024-08-13 ENCOUNTER — PATIENT MESSAGE (OUTPATIENT)
Dept: FAMILY MEDICINE | Facility: CLINIC | Age: 59
End: 2024-08-13
Payer: COMMERCIAL

## 2024-08-13 DIAGNOSIS — Z78.0 MENOPAUSE: ICD-10-CM

## 2024-08-13 DIAGNOSIS — Z00.00 WELLNESS EXAMINATION: ICD-10-CM

## 2024-08-13 DIAGNOSIS — Z13.820 ENCOUNTER FOR SCREENING FOR OSTEOPOROSIS: Primary | ICD-10-CM

## 2024-08-14 ENCOUNTER — CLINICAL SUPPORT (OUTPATIENT)
Dept: REHABILITATION | Facility: HOSPITAL | Age: 59
End: 2024-08-14
Payer: COMMERCIAL

## 2024-08-14 DIAGNOSIS — M25.612 DECREASED RANGE OF MOTION OF LEFT SHOULDER: Primary | ICD-10-CM

## 2024-08-14 DIAGNOSIS — M25.561 ACUTE PAIN OF RIGHT KNEE: ICD-10-CM

## 2024-08-14 PROCEDURE — 97110 THERAPEUTIC EXERCISES: CPT | Mod: PO | Performed by: PHYSICAL THERAPIST

## 2024-08-14 PROCEDURE — 97140 MANUAL THERAPY 1/> REGIONS: CPT | Mod: PO | Performed by: PHYSICAL THERAPIST

## 2024-08-14 PROCEDURE — 97112 NEUROMUSCULAR REEDUCATION: CPT | Mod: PO | Performed by: PHYSICAL THERAPIST

## 2024-08-14 NOTE — PROGRESS NOTES
"Physical Therapy Daily Treatment Note     Name: Tori Quinn  Clinic Number: [id number]    Therapy Diagnosis:   Encounter Diagnoses   Name Primary?    Decreased range of motion of left shoulder Yes    Acute pain of right knee      Physician: Philippe Hahn*    Visit Date: 8/14/2024    Physician Orders: PT Eval and Treat   Post Surgical?No Eval and TreatYes Type of TherapyOutpatient Therapy Location:Neck   Shoulder   Knees  Medical Diagnosis from Referral: M54.2 (ICD-10-CM) - Neck pain, musculoskeletal M25.519 (ICD-10-CM) - Shoulder pain, unspecified chronicity, unspecified laterality  Evaluation Date: 8/2/2024  Authorization Period Expiration: 12/31/2024  Plan of Care Expiration: 9/27/2024  Progress Note Due: 9/1/2024  Date of Surgery: na  Visit # / Visits authorized: 4/ 20  FOTO: 1/ 3 (8/2/2024)    Time In: 11:00 am  Time Out: 12:00 pm  Total Billable Time: 50 minutes    Precautions: Standard    Subjective     Pt reports: knees continue doing well. Bad HEADACHE Monday. Therapy helped and relieved headache. Freq and intensity of pain is improving and pt was able to turn head with driving.    She was somewhat compliant with home exercise program.  Response to previous treatment: No adverse effects  Functional change: TBD    Pain: 0/10 knees, 4/10 neck  Location:  bilateral knee , Left shoulder      Objective     Objective measures displayed on progress notes unless otherwise noted       Treatment      Tori received therapeutic exercises to develop strength, endurance, ROM, flexibility, posture and core stabilization for 20 minutes including:    UBE level 1 x 6 minutes (forward/backward)  UT stretch 3 x 30" ea  SCM stretch 3 x 30"  Levator Scapulae stretch 3 x 30"  Cervical Snags (ext/rotational) x 15 ea    Manual therapy x 10 mins STM to SCM and UPPER TRAP in supine, suboccipital distraction biased Left.    Tori participated in neuromuscular re-education activities to improve: Balance, " "Coordination, Proprioception and Posture for 25 minutes. The following activities were included:    Supine chin tuck 2 x 10  Supine Scapular retraction 2 x 10  Supine Scapular retraction + depression 2 x 10    3#:  Supine wand flexion 2 x 10, 3"  Supine protraction 2 x 10 with wand  Supine wand press up 2 x 10    Chin tucks w/OP 2 x 10  Scap retractions x 30  NO money RTB 2 x 10    Home Exercises Provided and Patient Education Provided     Education provided:   - HEP review    Written Home Exercises Provided: Patient instructed to cont prior HEP.  Exercises were reviewed and Tori was able to demonstrate them prior to the end of the session.  Tori demonstrated good  understanding of the education provided.     See EMR under Patient Instructions for exercises provided prior visit.    Assessment   Tori returned reporting improvements to fx. She has some improved pain. Cont MT and postural correction to improve muscular imbalances and QOL. Will continue to progress per pt's tolerance.    Tori Is progressing well towards her goals.   Pt prognosis is Good.     Pt will continue to benefit from skilled outpatient physical therapy to address the deficits listed in the problem list box on initial evaluation, provide pt/family education and to maximize pt's level of independence in the home and community environment.     Pt's spiritual, cultural and educational needs considered and pt agreeable to plan of care and goals.     Anticipated barriers to physical therapy: multiple joints, age     Goals:   Short Term Goals: 4 weeks   -Pt will demo full Left shoulder AROM with dec pn.  -Pt will demo inc cervical spine extension by 5 deg from improving flexibility.  -Pt will demo inc cervical spine lateral flexion bilateral by 5 deg for improving flexibility.  -Pr will report 3/10 neck pain for improving condition.     Long Term Goals: 8 weeks   -FOTO score improved to 53 for improving QOL.  -Inc Left shoulder strength " grossly equal to right.     Plan      Plan of care Certification: 8/2/2024 to 9/27/2024.     Outpatient Physical Therapy 2 times weekly for 8 weeks to include the following interventions: Aquatic Therapy, Cervical/Lumbar Traction, Electrical Stimulation IFC, Manual Therapy, Moist Heat/ Ice, Neuromuscular Re-ed, Patient Education, Therapeutic Activities, Therapeutic Exercise, Ultrasound, and dry needling.     Sofy Victor, PT

## 2024-08-14 NOTE — TELEPHONE ENCOUNTER
Pt is requesting bone density test.  L.o.v 07/24/2024    Last bone density test was done 06/24/2021

## 2024-08-19 ENCOUNTER — CLINICAL SUPPORT (OUTPATIENT)
Dept: REHABILITATION | Facility: HOSPITAL | Age: 59
End: 2024-08-19
Payer: COMMERCIAL

## 2024-08-19 DIAGNOSIS — M25.561 ACUTE PAIN OF RIGHT KNEE: ICD-10-CM

## 2024-08-19 DIAGNOSIS — M25.612 DECREASED RANGE OF MOTION OF LEFT SHOULDER: Primary | ICD-10-CM

## 2024-08-19 PROCEDURE — 97112 NEUROMUSCULAR REEDUCATION: CPT | Mod: PO,CQ

## 2024-08-19 PROCEDURE — 97110 THERAPEUTIC EXERCISES: CPT | Mod: PO,CQ

## 2024-08-19 PROCEDURE — 97140 MANUAL THERAPY 1/> REGIONS: CPT | Mod: PO,CQ

## 2024-08-19 NOTE — PROGRESS NOTES
"Physical Therapy Daily Treatment Note     Name: Tori Quinn  Clinic Number: [id number]    Therapy Diagnosis:   Encounter Diagnoses   Name Primary?    Decreased range of motion of left shoulder Yes    Acute pain of right knee      Physician: Philippe Hahn*    Visit Date: 8/19/2024    Physician Orders: PT Eval and Treat   Post Surgical?No Eval and TreatYes Type of TherapyOutpatient Therapy Location:Neck   Shoulder   Knees  Medical Diagnosis from Referral: M54.2 (ICD-10-CM) - Neck pain, musculoskeletal M25.519 (ICD-10-CM) - Shoulder pain, unspecified chronicity, unspecified laterality  Evaluation Date: 8/2/2024  Authorization Period Expiration: 12/31/2024  Plan of Care Expiration: 9/27/2024  Progress Note Due: 9/1/2024  Date of Surgery: na  Visit # / Visits authorized: 5/ 20  FOTO: 2/ 3 (8/19/2024)    Time In: 9:00 am  Time Out: 9:58 am  Total Billable Time: 58 minutes    Precautions: Standard    Subjective     Pt reports: decreased cervical pain levels upon arrival for today's visit and notes "It's been feeling better lately". She also notes decreased frequency and severity of head ache symptoms.    She was somewhat compliant with home exercise program.  Response to previous treatment: No adverse effects  Functional change: TBD    Pain: 0/10 knees, 2.5/10 neck  Location:  bilateral knee , Left shoulder      Objective     Objective measures displayed on progress notes unless otherwise noted       Treatment      Tori received therapeutic exercises to develop strength, endurance, ROM, flexibility, posture and core stabilization for 23 minutes including:    UBE level 1 x 6 minutes (forward/backward)  UT stretch 3 x 30" ea  SCM stretch 3 x 30"  Levator Scapulae stretch 3 x 30"  Cervical Snags (ext/rotational) x 15 ea    Manual therapy x 10 mins STM to SCM and UPPER TRAP in supine, suboccipital distraction biased Left.    Tori participated in neuromuscular re-education activities to improve: Balance, " "Coordination, Proprioception and Posture for 25 minutes. The following activities were included:    Supine chin tuck 2 x 10  Supine Scapular retraction 2 x 10  Supine Scapular retraction + depression 2 x 10    3#:  Supine wand flexion 2 x 10, 3"  Supine protraction 2 x 10 with wand  Supine wand press up 2 x 10    Chin tucks w/OP 2 x 10  Scap retractions x 30  NO money GTB 2 x 10    Home Exercises Provided and Patient Education Provided     Education provided:   - HEP review    Written Home Exercises Provided: Patient instructed to cont prior HEP.  Exercises were reviewed and Toir was able to demonstrate them prior to the end of the session.  Tori demonstrated good  understanding of the education provided.     See EMR under Patient Instructions for exercises provided prior visit.    Assessment   Tori continues to report decreased cervical pain and improved functional mobility. Treatment continued with cervical/postural mobility and strengthening exercises. Min verbal cueing required to decreased UT compensations with many seated exercises. Treatment was tolerated well with no increase in pain. Will continue to progress per pt's tolerance.    Tori Is progressing well towards her goals.   Pt prognosis is Good.     Pt will continue to benefit from skilled outpatient physical therapy to address the deficits listed in the problem list box on initial evaluation, provide pt/family education and to maximize pt's level of independence in the home and community environment.     Pt's spiritual, cultural and educational needs considered and pt agreeable to plan of care and goals.     Anticipated barriers to physical therapy: multiple joints, age     Goals:   Short Term Goals: 4 weeks   -Pt will demo full Left shoulder AROM with dec pn.  -Pt will demo inc cervical spine extension by 5 deg from improving flexibility.  -Pt will demo inc cervical spine lateral flexion bilateral by 5 deg for improving flexibility.  -Pr " will report 3/10 neck pain for improving condition.     Long Term Goals: 8 weeks   -FOTO score improved to 53 for improving QOL.  -Inc Left shoulder strength grossly equal to right.     Plan      Plan of care Certification: 8/2/2024 to 9/27/2024.     Outpatient Physical Therapy 2 times weekly for 8 weeks to include the following interventions: Aquatic Therapy, Cervical/Lumbar Traction, Electrical Stimulation IFC, Manual Therapy, Moist Heat/ Ice, Neuromuscular Re-ed, Patient Education, Therapeutic Activities, Therapeutic Exercise, Ultrasound, and dry needling.     Abhilash Adam, PTA

## 2024-08-29 ENCOUNTER — PATIENT MESSAGE (OUTPATIENT)
Dept: FAMILY MEDICINE | Facility: CLINIC | Age: 59
End: 2024-08-29
Payer: COMMERCIAL

## 2024-08-29 ENCOUNTER — HOSPITAL ENCOUNTER (OUTPATIENT)
Dept: RADIOLOGY | Facility: HOSPITAL | Age: 59
Discharge: HOME | End: 2024-08-29
Attending: INTERNAL MEDICINE
Payer: COMMERCIAL

## 2024-08-29 DIAGNOSIS — Z78.0 MENOPAUSE: ICD-10-CM

## 2024-08-29 DIAGNOSIS — Z00.00 WELLNESS EXAMINATION: ICD-10-CM

## 2024-08-29 DIAGNOSIS — Z13.820 ENCOUNTER FOR SCREENING FOR OSTEOPOROSIS: ICD-10-CM

## 2024-08-29 PROCEDURE — 77080 DXA BONE DENSITY AXIAL: CPT | Mod: TC,PO

## 2024-08-29 PROCEDURE — 77091 TBS TECHL CALCULATION ONLY: CPT | Mod: PO

## 2024-08-29 PROCEDURE — 77080 DXA BONE DENSITY AXIAL: CPT | Mod: 26,,, | Performed by: RADIOLOGY

## 2024-08-29 PROCEDURE — 77092 TBS I&R FX RSK QHP: CPT | Mod: ,,, | Performed by: RADIOLOGY

## 2024-09-10 DIAGNOSIS — L65.9 HAIR LOSS: ICD-10-CM

## 2024-09-10 RX ORDER — SPIRONOLACTONE 100 MG/1
TABLET, FILM COATED ORAL
Qty: 180 TABLET | Refills: 3 | Status: SHIPPED | OUTPATIENT
Start: 2024-09-10

## 2024-09-10 NOTE — TELEPHONE ENCOUNTER
Refill Decision Note   Tori Kai  is requesting a refill authorization.  Brief Assessment and Rationale for Refill:  Approve     Medication Therapy Plan:        Comments:     Note composed:11:04 AM 09/10/2024

## 2024-09-10 NOTE — TELEPHONE ENCOUNTER
No care due was identified.  [facility] Embedded Care Due Messages. Reference number: [id number].   9/10/2024 3:54:01 AM CDT

## 2024-10-08 DIAGNOSIS — L65.9 HAIR LOSS: ICD-10-CM

## 2024-10-08 RX ORDER — SPIRONOLACTONE 100 MG/1
TABLET, FILM COATED ORAL
Qty: 180 TABLET | Refills: 3 | Status: SHIPPED | OUTPATIENT
Start: 2024-10-08

## 2024-10-08 NOTE — TELEPHONE ENCOUNTER
Initial SW/CM Assessment/Plan of Care Note     Baseline Assessment  86 year old admitted 2/17/2021 as Observation with a diagnosis of CHF. Prior to admission patient was living with Alone and residing at [facility].  Patient’s Primary Care Provider is Jessica Soares MD.     Medical History  Past Medical History:   Diagnosis Date   • Arthritis     hips   • Atrial fibrillation (CMS/HCC)    • Congestive cardiac failure (CMS/HCC)    • COPD (chronic obstructive pulmonary disease) (CMS/HCC)    • Essential (primary) hypertension    • Hypothyroid    • Scoliosis        Prior to Admission Status  Verified Living arrangements as above.  Pt is independent in self care. Has 2 supportive daughters. Use to drive and is able to drive, but not going anywhere d/t COVID.     Agency/Support  Type of Services Prior to Hospitalization: None, Housekeeping  Support Systems: Children, Family members  Home Devices/Equipment: Shower chair, Elevated toilet seat  Mobility Assist Devices: Standard walker(sometimes uses a walker)  Sensory Support Devices: Eyeglasses, Hearing aids    Current Status  Current Mental Status: alert and oriented    Insurance  Primary: MEDICARE  Secondary: [facility]    Barriers to Discharge  Identified Barriers to Discharge/Transition Planning: Assessment/stabilization in progress    Progress Note  Met with pt and daughter to assess for needs at discharge.  Introduced and explained role of CM/Discharge planner. Provided patient with CM/SW contact info. Informed that Care Management team  will continue to follow and reevaluate for discharge needs.   Discussed management of own health prior to hospital admission.  Pt verbalized a good understanding of the things she is responsible for  in managing her health.  Knows about CHF diet and has information at home from prior admission and HH service. Declining HH now and denies any needs at d/c. Her daughters provide all needed assistance.     Potential  No care due was identified.  Health [facility] Embedded Care Due Messages. Reference number: [id number].   10/08/2024 6:47:54 AM CDT   barriers:  None    Plan  SW/CM - Recommendations for Discharge: home    Anticipate patient will not need post-hospital services. Necessary services are available.  Anticipate patient can return to the environment from which patient entered the hospital.   Anticipate patient can provide self-care at discharge.    Refer to SW/CM Flowsheet for Goals and objective data.

## 2024-10-08 NOTE — TELEPHONE ENCOUNTER
Refill Decision Note   Tori Kai  is requesting a refill authorization.  Brief Assessment and Rationale for Refill:  Approve     Medication Therapy Plan:         Comments:     Note composed:2:44 PM 10/08/2024

## 2024-10-11 ENCOUNTER — PATIENT MESSAGE (OUTPATIENT)
Dept: FAMILY MEDICINE | Facility: CLINIC | Age: 59
End: 2024-10-11
Payer: COMMERCIAL

## 2024-10-15 ENCOUNTER — TELEPHONE (OUTPATIENT)
Dept: FAMILY MEDICINE | Facility: CLINIC | Age: 59
End: 2024-10-15
Payer: COMMERCIAL

## 2024-10-15 NOTE — TELEPHONE ENCOUNTER
----- Message from Alfonso Pantoja MD sent at 10/15/2024 11:49 AM CDT -----   Call patient, schedule diagnostic mammogram/ultrasound right breast

## 2024-11-10 DIAGNOSIS — F32.A DEPRESSION, UNSPECIFIED DEPRESSION TYPE: ICD-10-CM

## 2024-11-10 RX ORDER — BUPROPION HYDROCHLORIDE 150 MG/1
150 TABLET ORAL
Qty: 90 TABLET | Refills: 2 | Status: SHIPPED | OUTPATIENT
Start: 2024-11-10

## 2024-11-11 NOTE — TELEPHONE ENCOUNTER
No care due was identified.  Health [facility] Embedded Care Due Messages. Reference number: [id number].   11/10/2024 9:04:54 PM CST

## 2024-11-11 NOTE — TELEPHONE ENCOUNTER
Refill Decision Note   Tori Kai  is requesting a refill authorization.  Brief Assessment and Rationale for Refill:  Approve     Medication Therapy Plan:         Comments:     Note composed:9:49 PM 11/10/2024

## 2024-11-18 ENCOUNTER — TELEPHONE (OUTPATIENT)
Dept: GASTROENTEROLOGY | Facility: CLINIC | Age: 59
End: 2024-11-18
Payer: COMMERCIAL

## 2024-11-18 NOTE — TELEPHONE ENCOUNTER
Spoke with pt. Scheduled c-scope. Prep placed in mail & sent to her mychart. Pt verbalized understanding to all

## 2025-01-16 ENCOUNTER — TELEPHONE (OUTPATIENT)
Dept: SURGERY | Facility: HOSPITAL | Age: 60
End: 2025-01-16
Payer: COMMERCIAL

## 2025-01-16 NOTE — TELEPHONE ENCOUNTER
Called and spoke with the patient, procedure rescheduled per patient request due to transportation, patient verbalzied understanding of the new date, prep information sent to patient portal.          Magnesium Citrate Prep     ALERT: Please notify the clinic if you start any blood thinners as does affect your procedure     NOTE:  -No ASPIRIN or ibuprofen products the morning of your procedure.  This includes Motrin, ALEVE, Advil, or other arthritis type medications. Tylenol is allowed.  -AVOID the following foods for 7-10 days prior to procedure: beans, peas, corn, nuts, popcorn, or tomatoes. If you forget we will not cancel your procedure.    You will need (over the counter) :  -2 ten (10) ounce bottles Magnesium Citrate (clear only)   -4 Dulcolax laxative tablets (any brand)    ONE DAY BEFORE YOUR PROCEDURE:  Take 2 Dulcolax laxatives the night prior to your prep day AT BEDTIME   Begin the clear liquid diet the entire day before your procedure  At 10 am, take 2 Dulcolax tablets  AT 2:00 PM, you will drink your first bottle of Magnesium Citrate. It will taste better if refrigerated (directions on bottle state do not refrigerate, this is okay for THIS occasion)    -It is important that you flush your system with at least Five - 8 ounce glasses of clear liquids by 8 PM.  At 6:00 PM, you will drink your second bottle of Magnesium Citrate    -Flush your system with at least THREE - 8 ounce glasses of water by midnight.    CLEAR LIQUIDS INCLUDE: Coffee (no cream), tea, apple juice, white grape juice, limit carbonated drinks to 2 in 24 hours, bullion, chicken broth, beef broth, Gatorade, Pako-Aid, jello, popsicles, snowballs, Italian ice, and hard candy. NO ALCOHOL. NOTHING RED OR PURPLE.     A preop nurse will call the day prior to your procedure to discuss medications you can and cannot take the morning of your procedure. Since sedation is used, you must have someone drive you home. It is [facility] policy that you may  not take a taxi home after sedation.    Please let us know if you have any questions after reading these instructions.     Thank you for allowing us to participate in your healthcare needs.     Respectfully,             NOTE:  ·         Dulaglutide (Trulicity) (weekly) - hold 8 days  ·         Exenatide extended release (Bydureon bcise) (weekly) - hold 8 days  ·         Semaglutide (Ozempic) (weekly) - hold 8 days  Tirepatide (Mounjaro) (weekly) - hold 8 days  Wegovy (weekly) - hold 8 days  ·         Exenatide (Byetta) (twice daily)- hold DAY OF PROCEDURE  ·         Liraglutide (Victoza, Saxenda) (daily)- hold DAY OF PROCEDURE  ·         Lixisenatide (Adlyxin) (daily)- hold DAY OF PROCEDURE  ·         Semaglutide (Rybelsus) (daily) -hold DAY OF PROCEDURE

## 2025-01-16 NOTE — TELEPHONE ENCOUNTER
Pt scheduled with Dr. Hartman on 1/23 but needs to reschedule d/t not having a ride. Please call to reschedule

## 2025-01-19 ENCOUNTER — PATIENT MESSAGE (OUTPATIENT)
Dept: FAMILY MEDICINE | Facility: CLINIC | Age: 60
End: 2025-01-19
Payer: COMMERCIAL

## 2025-02-03 DIAGNOSIS — F32.A DEPRESSION, UNSPECIFIED DEPRESSION TYPE: ICD-10-CM

## 2025-02-03 DIAGNOSIS — F41.0 GENERALIZED ANXIETY DISORDER WITH PANIC ATTACKS: ICD-10-CM

## 2025-02-03 DIAGNOSIS — F41.1 GENERALIZED ANXIETY DISORDER WITH PANIC ATTACKS: ICD-10-CM

## 2025-02-04 RX ORDER — DULOXETIN HYDROCHLORIDE 30 MG/1
30 CAPSULE, DELAYED RELEASE ORAL 2 TIMES DAILY
Qty: 180 CAPSULE | Refills: 1 | Status: SHIPPED | OUTPATIENT
Start: 2025-02-04

## 2025-02-04 NOTE — TELEPHONE ENCOUNTER
No care due was identified.  Health [facility] Embedded Care Due Messages. Reference number: [id number].   2/03/2025 9:42:27 PM CST

## 2025-02-04 NOTE — TELEPHONE ENCOUNTER
Refill Decision Note   Tori Kai  is requesting a refill authorization.  Brief Assessment and Rationale for Refill:  Approve     Medication Therapy Plan:         Comments:     Note composed:3:29 AM 02/04/2025

## 2025-02-26 RX ORDER — EPINEPHRINE 0.3 MG/.3ML
INJECTION SUBCUTANEOUS
Qty: 2 EACH | Refills: 1 | Status: SHIPPED | OUTPATIENT
Start: 2025-02-26

## 2025-02-26 NOTE — TELEPHONE ENCOUNTER
Refill Routing Note   Medication(s) are not appropriate for processing by [facility] for the following reason(s):        Outside of protocol    [facility] action(s):  Route               Appointments  past 12m or future 3m with PCP    Date Provider   Last Visit   7/24/2024 Philippe Hahn MD   Next Visit   4/23/2025 Philippe Hahn MD   ED visits in past 90 days: 0        Note composed:12:55 PM 02/26/2025

## 2025-03-17 ENCOUNTER — OFFICE VISIT (OUTPATIENT)
Dept: OBSTETRICS AND GYNECOLOGY | Facility: CLINIC | Age: 60
End: 2025-03-17
Payer: COMMERCIAL

## 2025-03-17 VITALS — WEIGHT: 174.38 LBS | BODY MASS INDEX: 29.93 KG/M2

## 2025-03-17 DIAGNOSIS — M85.80 OSTEOPENIA AFTER MENOPAUSE: ICD-10-CM

## 2025-03-17 DIAGNOSIS — Z01.419 ENCOUNTER FOR GYNECOLOGICAL EXAMINATION (GENERAL) (ROUTINE) WITHOUT ABNORMAL FINDINGS: Primary | ICD-10-CM

## 2025-03-17 DIAGNOSIS — Z78.0 OSTEOPENIA AFTER MENOPAUSE: ICD-10-CM

## 2025-03-17 DIAGNOSIS — Z12.4 CERVICAL CANCER SCREENING: ICD-10-CM

## 2025-03-17 PROCEDURE — 99396 PREV VISIT EST AGE 40-64: CPT | Mod: S$GLB,,, | Performed by: OBSTETRICS & GYNECOLOGY

## 2025-03-17 PROCEDURE — 99999 PR PBB SHADOW E&M-EST. PATIENT-LVL III: CPT | Mod: PBBFAC,,, | Performed by: OBSTETRICS & GYNECOLOGY

## 2025-03-17 PROCEDURE — 87624 HPV HI-RISK TYP POOLED RSLT: CPT | Performed by: OBSTETRICS & GYNECOLOGY

## 2025-03-17 PROCEDURE — 88175 CYTOPATH C/V AUTO FLUID REDO: CPT | Performed by: OBSTETRICS & GYNECOLOGY

## 2025-03-17 NOTE — PROGRESS NOTES
History and Physical:  Tori Quinn is a 59 y.o. F who presents today for her routine annual GYN exam. The patient has  no Gynecology complaints.    Annual:   No LMP recorded. Patient is postmenopausal.  Menopause: 54, stopped hormone replacement therapy, states she feels better without it.   Last Pap: 2021 NILM/HPV neg, next 2026  History of abnormal pap:  Last Mammogram: 10/2024 BIRADS 1; Tyrer-Cuzick risk 5%; next 10/2025  Colonosocpy: , next 5 years,  DUE  DEXA: Ostepenia 2024; next   PCP: Philippe Hahn MD orders routine labs 2024   Immunization status: up to date and documented.    Allergies:   Review of patient's allergies indicates:  No Known Allergies  Past Medical History:   Diagnosis Date    Anxiety and depression     Colon polyps 2019    Hair loss disorder      Past Surgical History:   Procedure Laterality Date    COLONOSCOPY W/ POLYPECTOMY  2019    HERNIA REPAIR      TONSILLECTOMY      TUBAL LIGATION       MEDS:   Medications Ordered Prior to Encounter[1]  OB History          5    Para   3    Term   3            AB   2    Living             SAB   2    IAB        Ectopic        Multiple        Live Births                   Social History[2]  Family History   Problem Relation Name Age of Onset    Ovarian cancer Mother Mom 70 - 79    Arthritis Mother Mom         poss rheum    Uterine cancer Mother Mom 77        xrt, chemo     Depression Father Dad         anxiety, ocd     Ovarian cancer Sister Soo 40 - 49    Cancer Sister Soo         Uterin cancer    Cancer Sister Sister         female cancer    Learning disabilities Daughter Peace         Severe sensory processing disorder    Anxiety disorder Daughter Peace     Ovarian cancer Maternal Grandmother Grandma 30 - 39    Cancer Maternal Grandmother Grandma         age 40 - female cancer     Learning disabilities Paternal Cousin Cash     Breast cancer Neg Hx         Past medical and surgical  history reviewed.   I have reviewed the patient's medical history in detail and updated the computerized patient record.    Review of System:   General: no chills, fever, night sweats, weight gain or weight loss  Breasts: no new or changing breast lumps, nipple discharge or masses.  Gastrointestinal: no abdominal pain, change in bowel habits, or black or bloody stools  Genito-Urinary: no incontinence, urinary frequency/urgency or vulvar/vaginal symptoms, pelvic pain or abnormal vaginal bleeding.    Physical Exam:   Wt 79.1 kg (174 lb 6.1 oz)   BMI 29.93 kg/m²   Body mass index is 29.93 kg/m².  Constitutional: She appears alert and responsive. She appears well-developed, well-groomed, and well-nourished. No distress.  Breasts: are symmetrical.  Right breast exhibits no inverted nipple, no mass, no nipple discharge, no skin change and no tenderness.  Left breast exhibits no inverted nipple, no mass, no nipple discharge, no skin change and no tenderness.  Abdominal: Soft. She exhibits no distension, hernias or masses. There is no tenderness. No enlargement of liver edge or spleen.  There is no rebound and no guarding.   Genitourinary:    External rectal exam shows no thrombosed external hemorrhoids, no lesions.     Pelvic exam was performed with patient supine.   No labial fusion, and symmetrical.    There is no rash, lesion or injury on the right labia.   There is no rash, lesion or injury on the left labia.   No bleeding and no signs of injury around the vaginal introitus, urethral meatus is normal size and without prolapse or lesions, urethra well supported. The cervix is visualized with no discharge, lesions or friability.   No vaginal discharge found.    No significant Cystocele, Enterocele or rectocele, and cervix and uterus well supported.   Bimanual exam:   The urethra is normal to palpation and there are no palpable vaginal wall masses.   Uterus is not deviated, not enlarged, not fixed, normal shape and not  tender.   Cervix exhibits no motion tenderness.    Right adnexum displays no mass or nodularity and no tenderness.   Left adnexum displays no mass or nodularity and no tenderness.    Assessment/Plan:   Encounter for gynecological examination (general) (routine) without abnormal findings    Osteopenia after menopause    RECOMMENDATIONS:  *Daily calcium intake 1397-8490 mg, dietary sources preferred; Vitamin D 3337-5499 IU daily.  *Weight bearing exercise and fall precautions.  *Repeat BMD in 2 years.      Follow up in 1 year.         [1]   Current Outpatient Medications on File Prior to Visit   Medication Sig Dispense Refill    buPROPion (WELLBUTRIN XL) 150 MG TB24 tablet TAKE 1 TABLET BY MOUTH ONCE  DAILY 90 tablet 2    butalbital-acetaminophen-caffeine -40 mg (FIORICET, ESGIC) -40 mg per tablet One p.o. x1 headache.  Repeat 2 hr as needed 10 tablet 0    cyclobenzaprine (FLEXERIL) 10 MG tablet Take 1 tablet (10 mg total) by mouth every evening. 30 tablet 6    DULoxetine (CYMBALTA) 30 MG capsule TAKE 1 CAPSULE BY MOUTH TWICE  DAILY 180 capsule 1    EPINEPHrine (EPIPEN) 0.3 mg/0.3 mL AtIn INJECT INTRAMUSCULARLY 1 PEN AS  NEEDED FOR ALLERGIC RESPONSE AS  DIRECTED BY MD. SEEK MEDICAL  HELP AFTER USE. 2 each 1    finasteride (PROSCAR) 5 mg tablet Take 5 mg by mouth.      LORazepam (ATIVAN) 0.5 MG tablet TAKE 1 TABLET BY MOUTH ONCE DAILY AS NEEDED UP TO 5 TIMES WEEKLY (SEDATION PRECAUTIONS) 10 tablet 0    meclizine (ANTIVERT) 25 mg tablet Take 1 tablet (25 mg total) by mouth 3 (three) times daily as needed for Dizziness. 30 tablet 0    minoxidiL (LONITEN) 2.5 MG tablet SMARTSI.25 Tablet(s) By Mouth Daily      spironolactone (ALDACTONE) 100 MG tablet TAKE 2 TABLETS BY MOUTH ONCE  DAILY 180 tablet 3    azelastine (ASTELIN) 137 mcg (0.1 %) nasal spray 1 spray (137 mcg total) by Nasal route 2 (two) times daily. (Patient not taking: Reported on 3/17/2025) 30 mL 6    hydrOXYzine pamoate (VISTARIL) 25 MG Cap Take  1 capsule (25 mg total) by mouth every 8 (eight) hours as needed (prn anxiety). 30 capsule 0    levocetirizine (XYZAL) 5 MG tablet Take 1 tablet (5 mg total) by mouth every evening. 30 tablet 0     No current facility-administered medications on file prior to visit.   [2]   Social History  Socioeconomic History    Marital status:     Number of children: 3   Occupational History    Occupation: para teacher    Tobacco Use    Smoking status: Never    Smokeless tobacco: Never   Substance and Sexual Activity    Alcohol use: Yes     Alcohol/week: 2.0 standard drinks of alcohol     Types: 1 Glasses of wine, 1 Cans of beer per week     Comment: rarely    Drug use: Never    Sexual activity: Yes     Partners: Male     Birth control/protection: Post-menopausal     Social Drivers of Health     Financial Resource Strain: Patient Declined (4/16/2024)    Overall Financial Resource Strain (CARDIA)     Difficulty of Paying Living Expenses: Patient declined   Food Insecurity: No Food Insecurity (4/16/2024)    Hunger Vital Sign     Worried About Running Out of Food in the Last Year: Never true     Ran Out of Food in the Last Year: Never true   Transportation Needs: No Transportation Needs (4/16/2024)    PRAPARE - Transportation     Lack of Transportation (Medical): No     Lack of Transportation (Non-Medical): No   Physical Activity: Unknown (4/16/2024)    Exercise Vital Sign     Days of Exercise per Week: 0 days   Stress: Stress Concern Present (4/16/2024)    [employer] - Occupational Stress Questionnaire     Feeling of Stress : Very much   Housing Stability: Unknown (4/16/2024)    Housing Stability Vital Sign     Unable to Pay for Housing in the Last Year: No

## 2025-03-20 ENCOUNTER — RESULTS FOLLOW-UP (OUTPATIENT)
Dept: OBSTETRICS AND GYNECOLOGY | Facility: CLINIC | Age: 60
End: 2025-03-20

## 2025-03-20 LAB
FINAL PATHOLOGIC DIAGNOSIS: NORMAL
Lab: NORMAL

## 2025-04-07 ENCOUNTER — PATIENT MESSAGE (OUTPATIENT)
Dept: FAMILY MEDICINE | Facility: CLINIC | Age: 60
End: 2025-04-07
Payer: COMMERCIAL

## 2025-04-07 DIAGNOSIS — F41.9 ANXIETY: ICD-10-CM

## 2025-04-07 DIAGNOSIS — T75.3XXS MOTION SICKNESS, SEQUELA: Primary | ICD-10-CM

## 2025-04-07 DIAGNOSIS — F41.8 SITUATIONAL ANXIETY: ICD-10-CM

## 2025-04-07 RX ORDER — MECLIZINE HYDROCHLORIDE 25 MG/1
25 TABLET ORAL 3 TIMES DAILY PRN
Qty: 30 TABLET | Refills: 0 | Status: SHIPPED | OUTPATIENT
Start: 2025-04-07

## 2025-04-07 RX ORDER — LORAZEPAM 0.5 MG/1
0.5 TABLET ORAL DAILY PRN
Qty: 10 TABLET | Refills: 0 | Status: SHIPPED | OUTPATIENT
Start: 2025-04-07 | End: 2025-04-17

## 2025-04-08 ENCOUNTER — TELEPHONE (OUTPATIENT)
Dept: SURGERY | Facility: HOSPITAL | Age: 60
End: 2025-04-08
Payer: COMMERCIAL

## 2025-04-08 NOTE — TELEPHONE ENCOUNTER
Patient is scheduled with Dr. Hartman 4/11. She left a message that she is needing to cancel because she is out of town and will contact the clinic when she is ready to reschedule. Thank you!

## 2025-04-23 ENCOUNTER — OFFICE VISIT (OUTPATIENT)
Dept: FAMILY MEDICINE | Facility: CLINIC | Age: 60
End: 2025-04-23
Payer: COMMERCIAL

## 2025-04-23 VITALS
WEIGHT: 173.81 LBS | OXYGEN SATURATION: 97 % | HEIGHT: 64 IN | BODY MASS INDEX: 29.67 KG/M2 | DIASTOLIC BLOOD PRESSURE: 76 MMHG | SYSTOLIC BLOOD PRESSURE: 104 MMHG | HEART RATE: 90 BPM

## 2025-04-23 DIAGNOSIS — Z00.00 WELLNESS EXAMINATION: ICD-10-CM

## 2025-04-23 DIAGNOSIS — F33.9 EPISODE OF RECURRENT MAJOR DEPRESSIVE DISORDER, UNSPECIFIED DEPRESSION EPISODE SEVERITY: ICD-10-CM

## 2025-04-23 DIAGNOSIS — E55.9 VITAMIN D DEFICIENCY: ICD-10-CM

## 2025-04-23 DIAGNOSIS — G47.00 INSOMNIA, UNSPECIFIED TYPE: ICD-10-CM

## 2025-04-23 DIAGNOSIS — M85.89 OSTEOPENIA OF MULTIPLE SITES: Primary | ICD-10-CM

## 2025-04-23 PROCEDURE — 99999 PR PBB SHADOW E&M-EST. PATIENT-LVL IV: CPT | Mod: PBBFAC,,, | Performed by: INTERNAL MEDICINE

## 2025-04-23 RX ORDER — HYDROXYZINE HYDROCHLORIDE 25 MG/1
25 TABLET, FILM COATED ORAL NIGHTLY PRN
Qty: 60 TABLET | Refills: 1 | Status: SHIPPED | OUTPATIENT
Start: 2025-04-23

## 2025-04-23 RX ORDER — VIT C/E/ZN/COPPR/LUTEIN/ZEAXAN 250MG-90MG
1000 CAPSULE ORAL DAILY
Qty: 90 CAPSULE | Refills: 3 | Status: SHIPPED | OUTPATIENT
Start: 2025-04-23

## 2025-04-23 NOTE — PROGRESS NOTES
Subjective:       Patient ID: Tori Quinn is a 59 y.o. female.    Chief Complaint: Osteoporosis (Treatment ) and Depression   HPI     History of Present Illness              Gyn: Dr Long   MMG: 10/24  Dexa:  8/24 Osteopenia L -1.7 & H -1.3   Colonoscopy:   3/2019 Dr Bermeo  tub adenoma r/c 5   Immunizations: Flu: D Tdap:  2020 Shingles: no    Smoker: no   Get old note        F/u      79's Alzheimer dx last 68's worried about genetics seen Dr. Cuellar in the past patient will follow up.   Takes long time to sleep     Depression/Insomnia: worse w fm stress, parents.  Did not realize she should be taking 60 mg of Cymbalta only taking 30.  Will increase today.  Rx Wellbutrin 150, Cymbalta 30   Seeing Counsler weekly. Sujatha Ray  Autistic daughter and family stress.    Prev Prozac 30 long-time stopped working. //MIKIE living w them, daughter autistic.         Arthralgias multi jts/Fibromyalgia: Cymbalta.  Hands bilateral. Left-hand dominant. DDD, mild thoracic scoliosis.     Heart palpations:  Stable     Vertigo/BPPV: + tinnitus both ears  MRI Mgmt neuro Dr Burks.       Headaches/migraines/tension:  Controlled Rx prn Fiorect , Flexeril     Insect bite reaction:  trigger spiders.  Rx EpiPen.       Met Syndrome: BMI 30/173-175 down from 185        ____________________________________________________________________________________________________  Assessment & Plan:  1. Osteopenia of multiple sites    2. Episode of recurrent major depressive disorder, unspecified depression episode severity    3. Insomnia, unspecified type  - hydrOXYzine HCL (ATARAX) 25 MG tablet; Take 1 tablet (25 mg total) by mouth nightly as needed (sleep).  Dispense: 60 tablet; Refill: 1    4. Vitamin D deficiency  - Vitamin D; Future  - Vitamin D  - cholecalciferol, vitamin D3, (VITAMIN D3) 25 mcg (1,000 unit) capsule; Take 1 capsule (1,000 Units total) by mouth once daily.  Dispense: 90 capsule; Refill: 3    5. Wellness examination  -  Vitamin D; Future  - Vitamin D  - CBC Without Differential; Future  - Comprehensive Metabolic Panel; Future  - Lipid Panel; Future  - TSH; Future  - CBC Without Differential  - Comprehensive Metabolic Panel  - Lipid Panel  - TSH     Osteopenia of multiple sites    Episode of recurrent major depressive disorder, unspecified depression episode severity  Comments:  take 60 mg Cymbalta daily - f/u 4 wk    Insomnia, unspecified type  -     hydrOXYzine HCL (ATARAX) 25 MG tablet; Take 1 tablet (25 mg total) by mouth nightly as needed (sleep).  Dispense: 60 tablet; Refill: 1    Vitamin D deficiency  -     Vitamin D; Future; Expected date: 04/23/2025  -     cholecalciferol, vitamin D3, (VITAMIN D3) 25 mcg (1,000 unit) capsule; Take 1 capsule (1,000 Units total) by mouth once daily.  Dispense: 90 capsule; Refill: 3    Wellness examination  -     Vitamin D; Future; Expected date: 04/23/2025  -     CBC Without Differential; Future; Expected date: 04/23/2025  -     Comprehensive Metabolic Panel; Future; Expected date: 04/23/2025  -     Lipid Panel; Future; Expected date: 04/23/2025  -     TSH; Future; Expected date: 04/23/2025        Continue to work on maintain healthy weight, balanced diet. Avoid unhealthy habits: smoking/vaping, excessive alcohol intake.     Recommend diet exercise:  High protein, low fat, low carb diet - calories women under <1200 & men <1800, carbs <100 G, protein 50-60 G daily. Protein supplements to replace one meal.  Keep all beverages < 10 calories per serving. Keep snacks < 100 calories.   Recommend exercise 160 minutes per week, combo of cardio and weight/strength training.     Visit today included increased complexity associated with the care of the episodic problem addressed and managing the longitudinal care of the patient due to the serious and/or complex managed problem(s). Depression       Disclaimer: This note was partly generated using dictation software which may occasionally result in  transcription errors  ____________________________________________________________________________________________________  Review of Systems:  Review of Systems   Constitutional:  Positive for fatigue. Negative for appetite change.   HENT:  Negative for nosebleeds.    Eyes:  Negative for pain.   Respiratory:  Negative for choking.    Cardiovascular:  Negative for chest pain.   Gastrointestinal:  Negative for blood in stool.   Genitourinary:  Negative for hematuria.   Musculoskeletal:  Negative for joint swelling.   Skin:  Negative for pallor.   Neurological:  Negative for facial asymmetry.   Hematological:  Does not bruise/bleed easily.   Psychiatric/Behavioral:  Positive for decreased concentration, dysphoric mood and sleep disturbance. Negative for confusion.          Negative     Objective:     Wt Readings from Last 3 Encounters:   04/23/25 78.8 kg (173 lb 13.3 oz)   03/17/25 79.1 kg (174 lb 6.1 oz)   10/10/24 79.4 kg (175 lb)     BP Readings from Last 3 Encounters:   04/23/25 104/76   07/24/24 104/84   04/16/24 106/76       Lab Results   Component Value Date    WBC 7.51 07/17/2024    HGB 14.0 07/17/2024    HCT 44.6 07/17/2024     07/17/2024     07/17/2024    K 4.7 07/17/2024     07/17/2024    ALT 24 07/17/2024    AST 18 07/17/2024    CO2 24 07/17/2024    CREATININE 1.0 07/17/2024    BUN 10 07/17/2024    GLU 91 07/17/2024      Hemoglobin A1C   Date Value Ref Range Status   07/17/2024 5.1 4.0 - 5.6 % Final     Comment:     ADA Screening Guidelines:  5.7-6.4%  Consistent with prediabetes  >or=6.5%  Consistent with diabetes    High levels of fetal hemoglobin interfere with the HbA1C  assay. Heterozygous hemoglobin variants (HbS, HgC, etc)do  not significantly interfere with this assay.   However, presence of multiple variants may affect accuracy.        Lab Results   Component Value Date    TSH 1.547 07/17/2024    TSH 2.050 03/30/2023    TSH 1.920 05/19/2022     No results found for:  ""FREET4"  Lab Results   Component Value Date    LDLCALC 116.0 07/17/2024    LDLCALC 135 (H) 03/30/2023    LDLCALC 127 (H) 05/19/2022     Lab Results   Component Value Date    TRIG 140 07/17/2024    TRIG 171 (H) 03/30/2023    TRIG 142 05/19/2022            Physical Exam  Constitutional:       Appearance: Normal appearance.   HENT:      Head: Normocephalic and atraumatic.   Eyes:      Extraocular Movements: Extraocular movements intact.      Pupils: Pupils are equal, round, and reactive to light.   Cardiovascular:      Rate and Rhythm: Normal rate.   Pulmonary:      Effort: Pulmonary effort is normal.   Neurological:      Mental Status: She is alert and oriented to person, place, and time.   Psychiatric:         Attention and Perception: Attention normal.         Mood and Affect: Mood normal. Affect is tearful.      Comments: Very tearful discussing family stress               Medication List with Changes/Refills   New Medications    CHOLECALCIFEROL, VITAMIN D3, (VITAMIN D3) 25 MCG (1,000 UNIT) CAPSULE    Take 1 capsule (1,000 Units total) by mouth once daily.    HYDROXYZINE HCL (ATARAX) 25 MG TABLET    Take 1 tablet (25 mg total) by mouth nightly as needed (sleep).   Current Medications    BUPROPION (WELLBUTRIN XL) 150 MG TB24 TABLET    TAKE 1 TABLET BY MOUTH ONCE  DAILY    BUTALBITAL-ACETAMINOPHEN-CAFFEINE -40 MG (FIORICET, ESGIC) -40 MG PER TABLET    One p.o. x1 headache.  Repeat 2 hr as needed    CYCLOBENZAPRINE (FLEXERIL) 10 MG TABLET    Take 1 tablet (10 mg total) by mouth every evening.    DULOXETINE (CYMBALTA) 30 MG CAPSULE    TAKE 1 CAPSULE BY MOUTH TWICE  DAILY    EPINEPHRINE (EPIPEN) 0.3 MG/0.3 ML ATIN    INJECT INTRAMUSCULARLY 1 PEN AS  NEEDED FOR ALLERGIC RESPONSE AS  DIRECTED BY MD. SEEK MEDICAL  HELP AFTER USE.    FINASTERIDE (PROSCAR) 5 MG TABLET    Take 5 mg by mouth.    LEVOCETIRIZINE (XYZAL) 5 MG TABLET    Take 1 tablet (5 mg total) by mouth every evening.    LORAZEPAM (ATIVAN) 0.5 MG " TABLET    Take 1 tablet (0.5 mg total) by mouth daily as needed for Anxiety. Take 1 hour before travel (sedation precautions)    MECLIZINE (ANTIVERT) 25 MG TABLET    Take 1 tablet (25 mg total) by mouth 3 (three) times daily as needed for Dizziness.    MINOXIDIL (LONITEN) 2.5 MG TABLET    SMARTSI.25 Tablet(s) By Mouth Daily    SPIRONOLACTONE (ALDACTONE) 100 MG TABLET    TAKE 2 TABLETS BY MOUTH ONCE  DAILY   Discontinued Medications    AZELASTINE (ASTELIN) 137 MCG (0.1 %) NASAL SPRAY    1 spray (137 mcg total) by Nasal route 2 (two) times daily.    HYDROXYZINE PAMOATE (VISTARIL) 25 MG CAP    Take 1 capsule (25 mg total) by mouth every 8 (eight) hours as needed (prn anxiety).

## 2025-08-29 ENCOUNTER — OFFICE VISIT (OUTPATIENT)
Dept: FAMILY MEDICINE | Facility: CLINIC | Age: 60
End: 2025-08-29
Payer: COMMERCIAL

## 2025-08-29 DIAGNOSIS — Z82.0 FAMILY HISTORY OF ALZHEIMER DISEASE: ICD-10-CM

## 2025-08-29 DIAGNOSIS — E78.2 MIXED HYPERLIPIDEMIA: ICD-10-CM

## 2025-08-29 DIAGNOSIS — M79.2 NEUROPATHIC PAIN OF RIGHT FOOT: ICD-10-CM

## 2025-08-29 DIAGNOSIS — F32.A DEPRESSION, UNSPECIFIED DEPRESSION TYPE: ICD-10-CM

## 2025-08-29 DIAGNOSIS — E55.9 VITAMIN D DEFICIENCY: ICD-10-CM

## 2025-08-29 DIAGNOSIS — K21.00 GASTROESOPHAGEAL REFLUX DISEASE WITH ESOPHAGITIS WITHOUT HEMORRHAGE: ICD-10-CM

## 2025-08-29 DIAGNOSIS — M85.89 OSTEOPENIA OF MULTIPLE SITES: ICD-10-CM

## 2025-08-29 DIAGNOSIS — F41.1 GENERALIZED ANXIETY DISORDER WITH PANIC ATTACKS: ICD-10-CM

## 2025-08-29 DIAGNOSIS — K22.4 ESOPHAGEAL SPASM: Primary | ICD-10-CM

## 2025-08-29 DIAGNOSIS — F41.0 GENERALIZED ANXIETY DISORDER WITH PANIC ATTACKS: ICD-10-CM

## 2025-08-29 DIAGNOSIS — Z00.00 WELLNESS EXAMINATION: ICD-10-CM

## 2025-08-29 RX ORDER — DULOXETIN HYDROCHLORIDE 30 MG/1
30 CAPSULE, DELAYED RELEASE ORAL 2 TIMES DAILY
Qty: 180 CAPSULE | Refills: 2 | Status: SHIPPED | OUTPATIENT
Start: 2025-08-29